# Patient Record
Sex: MALE | Race: WHITE | NOT HISPANIC OR LATINO
[De-identification: names, ages, dates, MRNs, and addresses within clinical notes are randomized per-mention and may not be internally consistent; named-entity substitution may affect disease eponyms.]

---

## 2017-04-03 ENCOUNTER — APPOINTMENT (OUTPATIENT)
Dept: FAMILY MEDICINE | Facility: CLINIC | Age: 67
End: 2017-04-03

## 2017-04-03 VITALS
SYSTOLIC BLOOD PRESSURE: 170 MMHG | WEIGHT: 238 LBS | HEIGHT: 68 IN | BODY MASS INDEX: 36.07 KG/M2 | OXYGEN SATURATION: 96 % | HEART RATE: 78 BPM | TEMPERATURE: 99.1 F | DIASTOLIC BLOOD PRESSURE: 98 MMHG

## 2017-04-03 VITALS — RESPIRATION RATE: 16 BRPM

## 2017-04-05 LAB
ALBUMIN SERPL ELPH-MCNC: 4.4 G/DL
ALP BLD-CCNC: 102 U/L
ALT SERPL-CCNC: 28 U/L
ANION GAP SERPL CALC-SCNC: 17 MMOL/L
APPEARANCE: CLEAR
AST SERPL-CCNC: 18 U/L
BACTERIA: NEGATIVE
BASOPHILS # BLD AUTO: 0.03 K/UL
BASOPHILS NFR BLD AUTO: 0.3 %
BILIRUB SERPL-MCNC: 0.3 MG/DL
BILIRUBIN URINE: NEGATIVE
BLOOD URINE: NEGATIVE
BUN SERPL-MCNC: 14 MG/DL
CALCIUM OXALATE CRYSTALS: ABNORMAL
CALCIUM SERPL-MCNC: 9.3 MG/DL
CHLORIDE SERPL-SCNC: 100 MMOL/L
CHOLEST SERPL-MCNC: 156 MG/DL
CHOLEST/HDLC SERPL: 4.7 RATIO
CK SERPL-CCNC: 128 U/L
CO2 SERPL-SCNC: 24 MMOL/L
COLOR: YELLOW
CREAT SERPL-MCNC: 0.72 MG/DL
CREAT SPEC-SCNC: 132 MG/DL
EOSINOPHIL # BLD AUTO: 0.31 K/UL
EOSINOPHIL NFR BLD AUTO: 3.3 %
GLUCOSE QUALITATIVE U: NORMAL MG/DL
GLUCOSE SERPL-MCNC: 176 MG/DL
HBA1C MFR BLD HPLC: 7.5 %
HCT VFR BLD CALC: 44.4 %
HDLC SERPL-MCNC: 33 MG/DL
HGB BLD-MCNC: 14.5 G/DL
IMM GRANULOCYTES NFR BLD AUTO: 0.1 %
KETONES URINE: NEGATIVE
LDLC SERPL CALC-MCNC: 82 MG/DL
LEUKOCYTE ESTERASE URINE: NEGATIVE
LYMPHOCYTES # BLD AUTO: 2.32 K/UL
LYMPHOCYTES NFR BLD AUTO: 25 %
MAN DIFF?: NORMAL
MCHC RBC-ENTMCNC: 28.7 PG
MCHC RBC-ENTMCNC: 32.7 GM/DL
MCV RBC AUTO: 87.7 FL
MICROALBUMIN 24H UR DL<=1MG/L-MCNC: 7.2 MG/DL
MICROALBUMIN/CREAT 24H UR-RTO: 55 UG/MG
MICROSCOPIC-UA: NORMAL
MONOCYTES # BLD AUTO: 0.76 K/UL
MONOCYTES NFR BLD AUTO: 8.2 %
NEUTROPHILS # BLD AUTO: 5.86 K/UL
NEUTROPHILS NFR BLD AUTO: 63.1 %
NITRITE URINE: NEGATIVE
PH URINE: 7
PLATELET # BLD AUTO: 262 K/UL
POTASSIUM SERPL-SCNC: 4.2 MMOL/L
PROT SERPL-MCNC: 7.2 G/DL
PROTEIN URINE: NEGATIVE MG/DL
RBC # BLD: 5.06 M/UL
RBC # FLD: 13.3 %
RED BLOOD CELLS URINE: 4 /HPF
SODIUM SERPL-SCNC: 141 MMOL/L
SPECIFIC GRAVITY URINE: 1.02
SQUAMOUS EPITHELIAL CELLS: 0 /HPF
TRIGL SERPL-MCNC: 203 MG/DL
UROBILINOGEN URINE: NORMAL MG/DL
WBC # FLD AUTO: 9.29 K/UL
WHITE BLOOD CELLS URINE: 2 /HPF

## 2017-05-08 ENCOUNTER — APPOINTMENT (OUTPATIENT)
Dept: FAMILY MEDICINE | Facility: CLINIC | Age: 67
End: 2017-05-08

## 2017-05-08 VITALS
RESPIRATION RATE: 16 BRPM | DIASTOLIC BLOOD PRESSURE: 90 MMHG | OXYGEN SATURATION: 95 % | SYSTOLIC BLOOD PRESSURE: 160 MMHG | HEART RATE: 82 BPM | TEMPERATURE: 98.8 F

## 2017-05-08 DIAGNOSIS — Z87.891 PERSONAL HISTORY OF NICOTINE DEPENDENCE: ICD-10-CM

## 2017-05-31 ENCOUNTER — RX RENEWAL (OUTPATIENT)
Age: 67
End: 2017-05-31

## 2017-07-14 ENCOUNTER — APPOINTMENT (OUTPATIENT)
Dept: FAMILY MEDICINE | Facility: CLINIC | Age: 67
End: 2017-07-14

## 2017-07-14 VITALS
HEIGHT: 68 IN | HEART RATE: 81 BPM | DIASTOLIC BLOOD PRESSURE: 79 MMHG | SYSTOLIC BLOOD PRESSURE: 160 MMHG | OXYGEN SATURATION: 96 % | BODY MASS INDEX: 36.07 KG/M2 | WEIGHT: 238 LBS | TEMPERATURE: 98.3 F

## 2017-07-14 VITALS — RESPIRATION RATE: 14 BRPM

## 2017-08-21 ENCOUNTER — APPOINTMENT (OUTPATIENT)
Dept: FAMILY MEDICINE | Facility: CLINIC | Age: 67
End: 2017-08-21
Payer: MEDICARE

## 2017-08-21 VITALS
OXYGEN SATURATION: 98 % | WEIGHT: 230 LBS | BODY MASS INDEX: 34.86 KG/M2 | DIASTOLIC BLOOD PRESSURE: 70 MMHG | SYSTOLIC BLOOD PRESSURE: 132 MMHG | TEMPERATURE: 98.4 F | HEART RATE: 84 BPM | HEIGHT: 68 IN | RESPIRATION RATE: 14 BRPM

## 2017-08-21 PROCEDURE — 99214 OFFICE O/P EST MOD 30 MIN: CPT | Mod: 25

## 2017-08-21 PROCEDURE — 36415 COLL VENOUS BLD VENIPUNCTURE: CPT

## 2017-08-23 LAB
ALBUMIN SERPL ELPH-MCNC: 4.4 G/DL
ALP BLD-CCNC: 92 U/L
ALT SERPL-CCNC: 30 U/L
ANION GAP SERPL CALC-SCNC: 16 MMOL/L
APPEARANCE: CLEAR
AST SERPL-CCNC: 16 U/L
BACTERIA: NEGATIVE
BASOPHILS # BLD AUTO: 0.02 K/UL
BASOPHILS NFR BLD AUTO: 0.2 %
BILIRUB SERPL-MCNC: 0.5 MG/DL
BILIRUBIN URINE: NEGATIVE
BLOOD URINE: NEGATIVE
BUN SERPL-MCNC: 16 MG/DL
CALCIUM SERPL-MCNC: 9.2 MG/DL
CHLORIDE SERPL-SCNC: 101 MMOL/L
CHOLEST SERPL-MCNC: 135 MG/DL
CHOLEST/HDLC SERPL: 4.8 RATIO
CK SERPL-CCNC: 141 U/L
CO2 SERPL-SCNC: 26 MMOL/L
COLOR: YELLOW
CREAT SERPL-MCNC: 0.95 MG/DL
CREAT SPEC-SCNC: 213 MG/DL
EOSINOPHIL # BLD AUTO: 0.27 K/UL
EOSINOPHIL NFR BLD AUTO: 3.1 %
GLUCOSE QUALITATIVE U: NORMAL MG/DL
GLUCOSE SERPL-MCNC: 157 MG/DL
HBA1C MFR BLD HPLC: 7.3 %
HCT VFR BLD CALC: 43.1 %
HDLC SERPL-MCNC: 28 MG/DL
HGB BLD-MCNC: 14.4 G/DL
IMM GRANULOCYTES NFR BLD AUTO: 0.1 %
KETONES URINE: NEGATIVE
LDLC SERPL CALC-MCNC: 64 MG/DL
LEUKOCYTE ESTERASE URINE: NEGATIVE
LYMPHOCYTES # BLD AUTO: 1.98 K/UL
LYMPHOCYTES NFR BLD AUTO: 22.8 %
MAN DIFF?: NORMAL
MCHC RBC-ENTMCNC: 29 PG
MCHC RBC-ENTMCNC: 33.4 GM/DL
MCV RBC AUTO: 86.9 FL
MICROALBUMIN 24H UR DL<=1MG/L-MCNC: 2.3 MG/DL
MICROALBUMIN/CREAT 24H UR-RTO: 11 MG/G
MICROSCOPIC-UA: NORMAL
MONOCYTES # BLD AUTO: 0.6 K/UL
MONOCYTES NFR BLD AUTO: 6.9 %
NEUTROPHILS # BLD AUTO: 5.8 K/UL
NEUTROPHILS NFR BLD AUTO: 66.9 %
NITRITE URINE: NEGATIVE
PH URINE: 6
PLATELET # BLD AUTO: 249 K/UL
POTASSIUM SERPL-SCNC: 3.6 MMOL/L
PROT SERPL-MCNC: 7.5 G/DL
PROTEIN URINE: NEGATIVE MG/DL
PSA SERPL-MCNC: 2.41 NG/ML
RBC # BLD: 4.96 M/UL
RBC # FLD: 13.5 %
RED BLOOD CELLS URINE: 4 /HPF
SODIUM SERPL-SCNC: 143 MMOL/L
SPECIFIC GRAVITY URINE: 1.02
SQUAMOUS EPITHELIAL CELLS: 0 /HPF
TRIGL SERPL-MCNC: 214 MG/DL
UROBILINOGEN URINE: NORMAL MG/DL
WBC # FLD AUTO: 8.68 K/UL
WHITE BLOOD CELLS URINE: 1 /HPF

## 2017-10-12 ENCOUNTER — RX RENEWAL (OUTPATIENT)
Age: 67
End: 2017-10-12

## 2017-11-30 ENCOUNTER — APPOINTMENT (OUTPATIENT)
Dept: FAMILY MEDICINE | Facility: CLINIC | Age: 67
End: 2017-11-30
Payer: MEDICARE

## 2017-11-30 VITALS
OXYGEN SATURATION: 97 % | HEIGHT: 70 IN | HEART RATE: 78 BPM | SYSTOLIC BLOOD PRESSURE: 150 MMHG | WEIGHT: 242 LBS | BODY MASS INDEX: 34.65 KG/M2 | DIASTOLIC BLOOD PRESSURE: 88 MMHG

## 2017-11-30 VITALS — RESPIRATION RATE: 14 BRPM

## 2017-11-30 DIAGNOSIS — K04.7 PERIAPICAL ABSCESS W/OUT SINUS: ICD-10-CM

## 2017-11-30 PROCEDURE — 90715 TDAP VACCINE 7 YRS/> IM: CPT

## 2017-11-30 PROCEDURE — 90471 IMMUNIZATION ADMIN: CPT

## 2017-11-30 PROCEDURE — 99214 OFFICE O/P EST MOD 30 MIN: CPT | Mod: 25

## 2017-12-03 ENCOUNTER — FORM ENCOUNTER (OUTPATIENT)
Age: 67
End: 2017-12-03

## 2017-12-04 ENCOUNTER — APPOINTMENT (OUTPATIENT)
Dept: CT IMAGING | Facility: HOSPITAL | Age: 67
End: 2017-12-04
Payer: MEDICARE

## 2017-12-04 ENCOUNTER — OUTPATIENT (OUTPATIENT)
Dept: OUTPATIENT SERVICES | Facility: HOSPITAL | Age: 67
LOS: 1 days | End: 2017-12-04
Payer: COMMERCIAL

## 2017-12-04 DIAGNOSIS — R91.1 SOLITARY PULMONARY NODULE: ICD-10-CM

## 2017-12-04 DIAGNOSIS — Z98.89 OTHER SPECIFIED POSTPROCEDURAL STATES: Chronic | ICD-10-CM

## 2017-12-04 DIAGNOSIS — Z90.49 ACQUIRED ABSENCE OF OTHER SPECIFIED PARTS OF DIGESTIVE TRACT: Chronic | ICD-10-CM

## 2017-12-04 DIAGNOSIS — R06.02 SHORTNESS OF BREATH: ICD-10-CM

## 2017-12-04 PROCEDURE — 71250 CT THORAX DX C-: CPT | Mod: 26

## 2017-12-04 PROCEDURE — 71250 CT THORAX DX C-: CPT

## 2017-12-29 ENCOUNTER — APPOINTMENT (OUTPATIENT)
Dept: FAMILY MEDICINE | Facility: CLINIC | Age: 67
End: 2017-12-29
Payer: MEDICARE

## 2017-12-29 VITALS
HEART RATE: 80 BPM | OXYGEN SATURATION: 97 % | SYSTOLIC BLOOD PRESSURE: 154 MMHG | WEIGHT: 234 LBS | BODY MASS INDEX: 33.5 KG/M2 | DIASTOLIC BLOOD PRESSURE: 100 MMHG | HEIGHT: 70 IN

## 2017-12-29 VITALS — RESPIRATION RATE: 16 BRPM

## 2017-12-29 DIAGNOSIS — R91.1 SOLITARY PULMONARY NODULE: ICD-10-CM

## 2017-12-29 PROCEDURE — 99214 OFFICE O/P EST MOD 30 MIN: CPT

## 2017-12-29 RX ORDER — SULFAMETHOXAZOLE AND TRIMETHOPRIM 800; 160 MG/1; MG/1
800-160 TABLET ORAL
Qty: 14 | Refills: 0 | Status: COMPLETED | COMMUNITY
Start: 2017-11-01

## 2017-12-29 RX ORDER — QUINAPRIL HYDROCHLORIDE 40 MG/1
40 TABLET, FILM COATED ORAL
Qty: 30 | Refills: 1 | Status: COMPLETED | COMMUNITY
Start: 2017-11-30 | End: 2017-12-29

## 2017-12-29 RX ORDER — CEFUROXIME AXETIL 500 MG/1
500 TABLET ORAL
Qty: 4 | Refills: 0 | Status: COMPLETED | COMMUNITY
Start: 2017-08-02

## 2017-12-29 RX ORDER — AMOXICILLIN 500 MG/1
500 CAPSULE ORAL
Qty: 30 | Refills: 0 | Status: COMPLETED | COMMUNITY
Start: 2017-11-30

## 2018-01-01 ENCOUNTER — RX RENEWAL (OUTPATIENT)
Age: 68
End: 2018-01-01

## 2018-01-19 ENCOUNTER — MEDICATION RENEWAL (OUTPATIENT)
Age: 68
End: 2018-01-19

## 2018-02-26 ENCOUNTER — MEDICATION RENEWAL (OUTPATIENT)
Age: 68
End: 2018-02-26

## 2018-03-07 ENCOUNTER — RX RENEWAL (OUTPATIENT)
Age: 68
End: 2018-03-07

## 2018-04-03 ENCOUNTER — RX RENEWAL (OUTPATIENT)
Age: 68
End: 2018-04-03

## 2018-04-30 ENCOUNTER — RX RENEWAL (OUTPATIENT)
Age: 68
End: 2018-04-30

## 2018-05-03 ENCOUNTER — RX RENEWAL (OUTPATIENT)
Age: 68
End: 2018-05-03

## 2018-05-03 ENCOUNTER — APPOINTMENT (OUTPATIENT)
Dept: FAMILY MEDICINE | Facility: CLINIC | Age: 68
End: 2018-05-03
Payer: COMMERCIAL

## 2018-05-03 VITALS
SYSTOLIC BLOOD PRESSURE: 174 MMHG | WEIGHT: 236 LBS | TEMPERATURE: 98.8 F | HEIGHT: 70 IN | BODY MASS INDEX: 33.79 KG/M2 | DIASTOLIC BLOOD PRESSURE: 82 MMHG | HEART RATE: 89 BPM | OXYGEN SATURATION: 95 %

## 2018-05-03 VITALS — RESPIRATION RATE: 16 BRPM

## 2018-05-03 DIAGNOSIS — R06.09 OTHER FORMS OF DYSPNEA: ICD-10-CM

## 2018-05-03 PROCEDURE — 99215 OFFICE O/P EST HI 40 MIN: CPT

## 2018-05-04 LAB
ALBUMIN SERPL ELPH-MCNC: 4.5 G/DL
ALP BLD-CCNC: 68 U/L
ALT SERPL-CCNC: 27 U/L
ANION GAP SERPL CALC-SCNC: 16 MMOL/L
APPEARANCE: CLEAR
AST SERPL-CCNC: 17 U/L
BACTERIA: NEGATIVE
BASOPHILS # BLD AUTO: 0.03 K/UL
BASOPHILS NFR BLD AUTO: 0.3 %
BILIRUB SERPL-MCNC: 0.3 MG/DL
BILIRUBIN URINE: NEGATIVE
BLOOD URINE: NEGATIVE
BUN SERPL-MCNC: 21 MG/DL
CALCIUM SERPL-MCNC: 9.9 MG/DL
CHLORIDE SERPL-SCNC: 99 MMOL/L
CHOLEST SERPL-MCNC: 164 MG/DL
CHOLEST/HDLC SERPL: 5.3 RATIO
CK SERPL-CCNC: 103 U/L
CO2 SERPL-SCNC: 28 MMOL/L
COLOR: YELLOW
CREAT SERPL-MCNC: 0.98 MG/DL
EOSINOPHIL # BLD AUTO: 0.23 K/UL
EOSINOPHIL NFR BLD AUTO: 1.9 %
GLUCOSE QUALITATIVE U: NEGATIVE MG/DL
GLUCOSE SERPL-MCNC: 208 MG/DL
HBA1C MFR BLD HPLC: 8.4 %
HCT VFR BLD CALC: 46.2 %
HDLC SERPL-MCNC: 31 MG/DL
HGB BLD-MCNC: 15.1 G/DL
IMM GRANULOCYTES NFR BLD AUTO: 0.2 %
KETONES URINE: NEGATIVE
LDLC SERPL CALC-MCNC: 82 MG/DL
LEUKOCYTE ESTERASE URINE: NEGATIVE
LYMPHOCYTES # BLD AUTO: 3.5 K/UL
LYMPHOCYTES NFR BLD AUTO: 29.4 %
MAN DIFF?: NORMAL
MCHC RBC-ENTMCNC: 28.6 PG
MCHC RBC-ENTMCNC: 32.7 GM/DL
MCV RBC AUTO: 87.5 FL
MICROSCOPIC-UA: NORMAL
MONOCYTES # BLD AUTO: 0.99 K/UL
MONOCYTES NFR BLD AUTO: 8.3 %
NEUTROPHILS # BLD AUTO: 7.13 K/UL
NEUTROPHILS NFR BLD AUTO: 59.9 %
NITRITE URINE: NEGATIVE
PH URINE: 6
PLATELET # BLD AUTO: 284 K/UL
POTASSIUM SERPL-SCNC: 3.8 MMOL/L
PROT SERPL-MCNC: 7.5 G/DL
PROTEIN URINE: NEGATIVE MG/DL
PSA SERPL-MCNC: 2.72 NG/ML
RBC # BLD: 5.28 M/UL
RBC # FLD: 13.2 %
RED BLOOD CELLS URINE: 6 /HPF
SODIUM SERPL-SCNC: 143 MMOL/L
SPECIFIC GRAVITY URINE: 1.03
SQUAMOUS EPITHELIAL CELLS: 0 /HPF
TRIGL SERPL-MCNC: 254 MG/DL
UROBILINOGEN URINE: NEGATIVE MG/DL
WBC # FLD AUTO: 11.9 K/UL
WHITE BLOOD CELLS URINE: 1 /HPF

## 2018-05-07 LAB
CREAT SPEC-SCNC: 167 MG/DL
MICROALBUMIN 24H UR DL<=1MG/L-MCNC: 5.2 MG/DL
MICROALBUMIN/CREAT 24H UR-RTO: 31 MG/G

## 2018-05-14 ENCOUNTER — CHART COPY (OUTPATIENT)
Age: 68
End: 2018-05-14

## 2018-05-18 ENCOUNTER — RX RENEWAL (OUTPATIENT)
Age: 68
End: 2018-05-18

## 2018-05-25 ENCOUNTER — RX RENEWAL (OUTPATIENT)
Age: 68
End: 2018-05-25

## 2018-06-15 ENCOUNTER — RX RENEWAL (OUTPATIENT)
Age: 68
End: 2018-06-15

## 2018-07-26 ENCOUNTER — RX RENEWAL (OUTPATIENT)
Age: 68
End: 2018-07-26

## 2018-09-17 ENCOUNTER — RX RENEWAL (OUTPATIENT)
Age: 68
End: 2018-09-17

## 2018-09-27 ENCOUNTER — APPOINTMENT (OUTPATIENT)
Dept: FAMILY MEDICINE | Facility: CLINIC | Age: 68
End: 2018-09-27
Payer: COMMERCIAL

## 2018-09-27 VITALS
RESPIRATION RATE: 14 BRPM | HEIGHT: 70 IN | HEART RATE: 76 BPM | TEMPERATURE: 98.2 F | WEIGHT: 219 LBS | SYSTOLIC BLOOD PRESSURE: 148 MMHG | DIASTOLIC BLOOD PRESSURE: 80 MMHG | BODY MASS INDEX: 31.35 KG/M2 | OXYGEN SATURATION: 97 %

## 2018-09-27 PROCEDURE — 99214 OFFICE O/P EST MOD 30 MIN: CPT | Mod: 25

## 2018-09-27 PROCEDURE — 36415 COLL VENOUS BLD VENIPUNCTURE: CPT

## 2018-09-27 NOTE — ASSESSMENT
[FreeTextEntry1] : Assessment and plan:\par \par 1. Hypertension, presently difficult to control.. Patient will continue to keep home log and lifestyle changes will be instituted, which will include a weight loss program.Patient has been tolerating beta blocker, blood pressure still slightly elevated. Amlodipine has been stopped patient's edema has improved.\par \par 2. Diabetes mellitus, type II continue present medical management. Check hemoglobin A1c. No concentrated sweets, consistent carbohydrate diet.\par \par 3. Hyperlipidemia. Continue simvastatin 20 mg p.o. at bedtime.\par \par 4. Elevated BMI. Detailed discussion with loss program. Wife is present. She will make sure that he will follow a diet

## 2018-09-27 NOTE — REVIEW OF SYSTEMS
[Chest Pain] : no chest pain [Palpitations] : no palpitations [Lower Ext Edema] : lower extremity edema [Orthopena] : no orthopnea [Paroysmal Nocturnal Dyspnea] : no paroysmal nocturnal dyspnea [Shortness Of Breath] : no shortness of breath [Wheezing] : no wheezing [Cough] : no cough [Dyspnea on Exertion] : dyspnea on exertion [Negative] : Heme/Lymph

## 2018-09-27 NOTE — HISTORY OF PRESENT ILLNESS
[Diabetes Mellitus] : Diabetes Mellitus [Hyperlipidemia] : Hyperlipidemia [Hypertension] : Hypertension [Obesity] : Obesity [Other: ___] : [unfilled]: [Spouse] : spouse [Does not check] : Patient does not check blood glucose regularly [Understanding of foot care] : Patient expressed understanding of foot care [Most Recent A1C: ___] : Most recent A1C was [unfilled] [Near target goal] : A1C near target goal [Moderate Intensity] : Patient is currently on moderate intensity statin  therapy [Checks BP Regularly] : The patient checks ~his/her~ blood pressure regularly [<130/90] : Target blood pressure is  <130/90 [Target goal not met] : BP target goal not met [Doing Well] : Patient is doing well [Managed with medications] : managed with  medication [Moderate Intensity Therapy] : Patient is currently on moderate intensity statin  therapy [No Weight Changes] : No weight changes [None] : No weight lost attempts [Sedentary] : Patient is sedentary [Patient refused treatment] : Patient refused treatment [Pre-contemplation] : Pre-contemplation: patient is not planning to lose weight within the next 6 months [Good understanding] : Patient has a good understanding of disease, goals and obesity follow-up plan [FreeTextEntry6] : Patient is a 68-year-old gentleman, who presents today for followup of maintenance visit. Patient states it is in his usual state of health. His medical history is significant for diabetes mellitus, type II hyperlipidemia, hypertension, at times difficult to control elevated BMI and peripheral vascular disease.\par \par Patient is awake, alert, and oriented x3. He denies any chest pain, shortness of breath, nausea, vomiting, no change in bowel habit. He has chronic lower pedal edema, which may be secondary to amlodipine, but also complicated by peripheral vascular disease. Patient states that he tolerates it well without any problem.\par \par Patient declines all immunizations. Except for tetanus, which she has had in the past. [Retinopathy] : No retinopathy [EyeExamDate] : 05/17 [de-identified] : 140/80

## 2018-09-27 NOTE — HEALTH RISK ASSESSMENT
[No falls in past year] : Patient reported no falls in the past year [0] : 2) Feeling down, depressed, or hopeless: Not at all (0) [] : No [[id number]] : 0

## 2018-09-28 LAB
ALBUMIN SERPL ELPH-MCNC: 4.8 G/DL
ALP BLD-CCNC: 58 U/L
ALT SERPL-CCNC: 27 U/L
ANION GAP SERPL CALC-SCNC: 16 MMOL/L
AST SERPL-CCNC: 19 U/L
BASOPHILS # BLD AUTO: 0.02 K/UL
BASOPHILS NFR BLD AUTO: 0.3 %
BILIRUB SERPL-MCNC: 0.6 MG/DL
BUN SERPL-MCNC: 22 MG/DL
CALCIUM SERPL-MCNC: 9.9 MG/DL
CHLORIDE SERPL-SCNC: 101 MMOL/L
CHOLEST SERPL-MCNC: 176 MG/DL
CHOLEST/HDLC SERPL: 4.8 RATIO
CK SERPL-CCNC: 104 U/L
CO2 SERPL-SCNC: 27 MMOL/L
CREAT SERPL-MCNC: 0.93 MG/DL
EOSINOPHIL # BLD AUTO: 0.14 K/UL
EOSINOPHIL NFR BLD AUTO: 1.8 %
GLUCOSE SERPL-MCNC: 137 MG/DL
HBA1C MFR BLD HPLC: 6 %
HCT VFR BLD CALC: 49 %
HCV AB SER QL: NONREACTIVE
HCV S/CO RATIO: 0.07 S/CO
HDLC SERPL-MCNC: 37 MG/DL
HGB BLD-MCNC: 16.1 G/DL
IMM GRANULOCYTES NFR BLD AUTO: 0.3 %
LDLC SERPL CALC-MCNC: 108 MG/DL
LYMPHOCYTES # BLD AUTO: 1.91 K/UL
LYMPHOCYTES NFR BLD AUTO: 24.7 %
MAN DIFF?: NORMAL
MCHC RBC-ENTMCNC: 29 PG
MCHC RBC-ENTMCNC: 32.9 GM/DL
MCV RBC AUTO: 88.3 FL
MONOCYTES # BLD AUTO: 0.73 K/UL
MONOCYTES NFR BLD AUTO: 9.4 %
NEUTROPHILS # BLD AUTO: 4.92 K/UL
NEUTROPHILS NFR BLD AUTO: 63.5 %
PLATELET # BLD AUTO: 221 K/UL
POTASSIUM SERPL-SCNC: 3.7 MMOL/L
PROT SERPL-MCNC: 7.7 G/DL
RBC # BLD: 5.55 M/UL
RBC # FLD: 14 %
SODIUM SERPL-SCNC: 144 MMOL/L
TRIGL SERPL-MCNC: 153 MG/DL
WBC # FLD AUTO: 7.74 K/UL

## 2018-10-26 ENCOUNTER — RX RENEWAL (OUTPATIENT)
Age: 68
End: 2018-10-26

## 2018-11-01 ENCOUNTER — FORM ENCOUNTER (OUTPATIENT)
Age: 68
End: 2018-11-01

## 2018-11-02 ENCOUNTER — APPOINTMENT (OUTPATIENT)
Dept: ULTRASOUND IMAGING | Facility: HOSPITAL | Age: 68
End: 2018-11-02
Payer: COMMERCIAL

## 2018-11-02 ENCOUNTER — OUTPATIENT (OUTPATIENT)
Dept: OUTPATIENT SERVICES | Facility: HOSPITAL | Age: 68
LOS: 1 days | End: 2018-11-02
Payer: COMMERCIAL

## 2018-11-02 DIAGNOSIS — Z98.89 OTHER SPECIFIED POSTPROCEDURAL STATES: Chronic | ICD-10-CM

## 2018-11-02 DIAGNOSIS — Z90.49 ACQUIRED ABSENCE OF OTHER SPECIFIED PARTS OF DIGESTIVE TRACT: Chronic | ICD-10-CM

## 2018-11-02 DIAGNOSIS — Z00.8 ENCOUNTER FOR OTHER GENERAL EXAMINATION: ICD-10-CM

## 2018-11-02 PROCEDURE — 76870 US EXAM SCROTUM: CPT | Mod: 26

## 2018-11-02 PROCEDURE — 76870 US EXAM SCROTUM: CPT

## 2018-12-01 ENCOUNTER — APPOINTMENT (OUTPATIENT)
Dept: ULTRASOUND IMAGING | Facility: HOSPITAL | Age: 68
End: 2018-12-01
Payer: COMMERCIAL

## 2018-12-01 ENCOUNTER — OUTPATIENT (OUTPATIENT)
Dept: OUTPATIENT SERVICES | Facility: HOSPITAL | Age: 68
LOS: 1 days | End: 2018-12-01
Payer: COMMERCIAL

## 2018-12-01 DIAGNOSIS — Z98.89 OTHER SPECIFIED POSTPROCEDURAL STATES: Chronic | ICD-10-CM

## 2018-12-01 DIAGNOSIS — Z00.8 ENCOUNTER FOR OTHER GENERAL EXAMINATION: ICD-10-CM

## 2018-12-01 DIAGNOSIS — Z90.49 ACQUIRED ABSENCE OF OTHER SPECIFIED PARTS OF DIGESTIVE TRACT: Chronic | ICD-10-CM

## 2018-12-01 PROCEDURE — 76870 US EXAM SCROTUM: CPT | Mod: 26

## 2018-12-01 PROCEDURE — 76870 US EXAM SCROTUM: CPT

## 2018-12-19 ENCOUNTER — RX RENEWAL (OUTPATIENT)
Age: 68
End: 2018-12-19

## 2018-12-20 ENCOUNTER — RX RENEWAL (OUTPATIENT)
Age: 68
End: 2018-12-20

## 2018-12-27 ENCOUNTER — APPOINTMENT (OUTPATIENT)
Dept: FAMILY MEDICINE | Facility: CLINIC | Age: 68
End: 2018-12-27
Payer: COMMERCIAL

## 2018-12-27 VITALS
SYSTOLIC BLOOD PRESSURE: 132 MMHG | HEART RATE: 70 BPM | RESPIRATION RATE: 14 BRPM | TEMPERATURE: 98.2 F | DIASTOLIC BLOOD PRESSURE: 74 MMHG | OXYGEN SATURATION: 98 % | HEIGHT: 70 IN | BODY MASS INDEX: 31.5 KG/M2 | WEIGHT: 220 LBS

## 2018-12-27 PROCEDURE — 99214 OFFICE O/P EST MOD 30 MIN: CPT | Mod: 25

## 2018-12-27 PROCEDURE — 36415 COLL VENOUS BLD VENIPUNCTURE: CPT

## 2018-12-27 NOTE — PHYSICAL EXAM
[No Acute Distress] : no acute distress [Well Nourished] : well nourished [Well Developed] : well developed [Well-Appearing] : well-appearing [Normal Sclera/Conjunctiva] : normal sclera/conjunctiva [PERRL] : pupils equal round and reactive to light [EOMI] : extraocular movements intact [Normal Outer Ear/Nose] : the outer ears and nose were normal in appearance [Normal Oropharynx] : the oropharynx was normal [No JVD] : no jugular venous distention [Supple] : supple [No Lymphadenopathy] : no lymphadenopathy [Thyroid Normal, No Nodules] : the thyroid was normal and there were no nodules present [No Respiratory Distress] : no respiratory distress  [Clear to Auscultation] : lungs were clear to auscultation bilaterally [No Accessory Muscle Use] : no accessory muscle use [Normal Rate] : normal rate  [Regular Rhythm] : with a regular rhythm [Normal S1, S2] : normal S1 and S2 [No Murmur] : no murmur heard [No Carotid Bruits] : no carotid bruits [No Abdominal Bruit] : a ~M bruit was not heard ~T in the abdomen [No Varicosities] : no varicosities [Pedal Pulses Present] : the pedal pulses are present [No Edema] : there was no peripheral edema [No Extremity Clubbing/Cyanosis] : no extremity clubbing/cyanosis [No Palpable Aorta] : no palpable aorta [Soft] : abdomen soft [Non Tender] : non-tender [Non-distended] : non-distended [No Masses] : no abdominal mass palpated [No HSM] : no HSM [Normal Bowel Sounds] : normal bowel sounds [Normal Posterior Cervical Nodes] : no posterior cervical lymphadenopathy [Normal Anterior Cervical Nodes] : no anterior cervical lymphadenopathy [No CVA Tenderness] : no CVA  tenderness [No Spinal Tenderness] : no spinal tenderness [No Joint Swelling] : no joint swelling [Grossly Normal Strength/Tone] : grossly normal strength/tone [No Rash] : no rash [No Skin Lesions] : no skin lesions [Normal Gait] : normal gait [Coordination Grossly Intact] : coordination grossly intact [No Focal Deficits] : no focal deficits [Deep Tendon Reflexes (DTR)] : deep tendon reflexes were 2+ and symmetric [Speech Grossly Normal] : speech grossly normal [Memory Grossly Normal] : memory grossly normal [Normal Affect] : the affect was normal [Alert and Oriented x3] : oriented to person, place, and time [Normal Mood] : the mood was normal [Normal Insight/Judgement] : insight and judgment were intact [Comprehensive Foot Exam Normal] : Right and left foot were examined and both feet are normal. No ulcers in either foot. Toes are normal and with full ROM.  Normal tactile sensation with monofilament testing throughout both feet [de-identified] : ecept for pedal edema

## 2018-12-27 NOTE — ASSESSMENT
[FreeTextEntry1] : Assessment and plan:\par \par 1. Hypertension excellent blood pressure control. Continue clonidine 0.1 mg twice a day, hydralazine 50 mg twice a day. Metroprolol 50 mg p.o. daily. Patient also taken valsartan hydrochlorothiazide. Continue lifestyle changes.\par \par 2. Diabetes mellitus, type II. Patient tolerating metformin 500 mg twice a day. I will check comprehensive blood work, which will include hemoglobin A1c.\par \par 3. Hyperlipidemia. Patient tolerating and doing well with simvastatin 20 mg p.o. at bedtime. Check comprehensive blood work, which will include comprehensive metabolic lipid profile, and CPK.\par \par 4. Elevated BMI. Discussed lifestyle changes.\par \par 5. History of bladder cancer. Patient is followed very closely by urology. Will be having cystoscopy this coming January.\par \par 6. Elevated PSA. Check prostate-specific antigen, total and free a copy of the blood work will be sent to urology

## 2018-12-27 NOTE — COUNSELING
[Weight management counseling provided] : Weight management [Healthy eating counseling provided] : healthy eating [Activity counseling provided] : activity [Disease Management counseling provided] : disease management  [Behavioral health counseling provided] : behavioral health  [Target Wt Loss Goal ___] : Target weight loss goal [unfilled] lbs [Weight Self Once Weekly] : Weight self once weekly [Walking] : Walking [___ min/wk activity recom] : [unfilled] min/wk activity recommended [Low Fat Diet] : Low fat diet [Low Salt Diet] : Low salt diet [Decrease Portions] : Decrease food portions [Keep Food Diary] : Keep food diary [Take medications as directed] : Take medications as directed [Check sugar ___ times per week] : Check blood sugar [unfilled]  times per week [Check feet daily] : Check feet daily [Keep FS  log to bring to next visit] : Keep finger stick log and  bring  to next visit [Maintain symptoms  log] : Maintain symptoms log [Take your weight daily] : Take your weight daily [Maintain weight log] : Maintain weight log [Check BP at home ___ x/week] : Check blood pressure at home [unfilled] times per week [Keep BP log to bring to next visit] : Keep BP log to bring to next visit [Avoid Social Isolation] : Avoid social isolation [Sleep ___ hours/day] : Sleep [unfilled] hours/day [Engage in a relaxing activity] : Engage in a relaxing activity [Plan in advance] : Plan in advance [None] : None [Good understanding] : Patient has a good understanding of lifestyle changes and the steps needed to achieve self management goals

## 2018-12-27 NOTE — HISTORY OF PRESENT ILLNESS
[Diabetes Mellitus] : Diabetes Mellitus [Hyperlipidemia] : Hyperlipidemia [Hypertension] : Hypertension [Obesity] : Obesity [Other: ___] : [unfilled]: [Spouse] : spouse [Does not check] : Patient does not check blood glucose regularly [Understanding of foot care] : Patient expressed understanding of foot care [Most Recent A1C: ___] : Most recent A1C was [unfilled] [Near target goal] : A1C near target goal [Moderate Intensity] : Patient is currently on moderate intensity statin  therapy [Checks BP Regularly] : The patient checks ~his/her~ blood pressure regularly [<130/90] : Target blood pressure is  <130/90 [Target goal not met] : BP target goal not met [Doing Well] : Patient is doing well [Managed with medications] : managed with  medication [Moderate Intensity Therapy] : Patient is currently on moderate intensity statin  therapy [No Weight Changes] : No weight changes [None] : No weight lost attempts [Sedentary] : Patient is sedentary [Patient refused treatment] : Patient refused treatment [Pre-contemplation] : Pre-contemplation: patient is not planning to lose weight within the next 6 months [Good understanding] : Patient has a good understanding of disease, goals and obesity follow-up plan [FreeTextEntry6] : Patient is a 68-year-old gentleman, who presents today for followup disease management. Patient does have multiple medical problems and we will be addressing diabetes mellitus, type II. Hyperlipidemia, hypertension, elevated BMI. Peripheral vascular disease with chronic lower extremity edema and bladder cancer, for which the patient is followed by urology. Patient also has history of colon cancer, status post resection\par \par Patient is awake, alert, and oriented x3, presently in no acute distress. He is calm, cooperative, well-groomed, and nourished. Presently denies any chest pain. He does admit to exertional dyspnea, which has been a chronic problem. He has been evaluated by cardiology in the past. [Retinopathy] : No retinopathy [EyeExamDate] : 05/17 [de-identified] : 140/80

## 2018-12-27 NOTE — HEALTH RISK ASSESSMENT
[No falls in past year] : Patient reported no falls in the past year [0] : 2) Feeling down, depressed, or hopeless: Not at all (0) [] : No [de-identified] : social; [[id number]] : 0

## 2018-12-27 NOTE — REVIEW OF SYSTEMS
[Lower Ext Edema] : lower extremity edema [Dyspnea on Exertion] : dyspnea on exertion [Negative] : Heme/Lymph [Chest Pain] : no chest pain [Palpitations] : no palpitations [Orthopena] : no orthopnea [Paroysmal Nocturnal Dyspnea] : no paroysmal nocturnal dyspnea [Shortness Of Breath] : no shortness of breath [Wheezing] : no wheezing [Cough] : no cough

## 2018-12-28 LAB
ALBUMIN SERPL ELPH-MCNC: 4.1 G/DL
ALP BLD-CCNC: 67 U/L
ALT SERPL-CCNC: 17 U/L
ANION GAP SERPL CALC-SCNC: 14 MMOL/L
AST SERPL-CCNC: 13 U/L
BASOPHILS # BLD AUTO: 0.01 K/UL
BASOPHILS NFR BLD AUTO: 0.1 %
BILIRUB SERPL-MCNC: 0.4 MG/DL
BUN SERPL-MCNC: 20 MG/DL
CALCIUM SERPL-MCNC: 9.2 MG/DL
CHLORIDE SERPL-SCNC: 101 MMOL/L
CHOLEST SERPL-MCNC: 147 MG/DL
CHOLEST/HDLC SERPL: 4.7 RATIO
CK SERPL-CCNC: 72 U/L
CO2 SERPL-SCNC: 29 MMOL/L
CREAT SERPL-MCNC: 0.93 MG/DL
EOSINOPHIL # BLD AUTO: 0.25 K/UL
EOSINOPHIL NFR BLD AUTO: 2.7 %
GLUCOSE SERPL-MCNC: 148 MG/DL
HBA1C MFR BLD HPLC: 6 %
HCT VFR BLD CALC: 42.9 %
HDLC SERPL-MCNC: 31 MG/DL
HGB BLD-MCNC: 13.9 G/DL
IMM GRANULOCYTES NFR BLD AUTO: 0.3 %
LDLC SERPL CALC-MCNC: 87 MG/DL
LYMPHOCYTES # BLD AUTO: 1.19 K/UL
LYMPHOCYTES NFR BLD AUTO: 13.1 %
MAN DIFF?: NORMAL
MCHC RBC-ENTMCNC: 28.7 PG
MCHC RBC-ENTMCNC: 32.4 GM/DL
MCV RBC AUTO: 88.6 FL
MONOCYTES # BLD AUTO: 0.7 K/UL
MONOCYTES NFR BLD AUTO: 7.7 %
NEUTROPHILS # BLD AUTO: 6.93 K/UL
NEUTROPHILS NFR BLD AUTO: 76.1 %
PLATELET # BLD AUTO: 231 K/UL
POTASSIUM SERPL-SCNC: 3.7 MMOL/L
PROT SERPL-MCNC: 6.9 G/DL
PSA FREE FLD-MCNC: 26 %
PSA FREE SERPL-MCNC: 0.71 NG/ML
PSA SERPL-MCNC: 2.68 NG/ML
RBC # BLD: 4.84 M/UL
RBC # FLD: 13.9 %
SODIUM SERPL-SCNC: 144 MMOL/L
T4 FREE SERPL-MCNC: 1.2 NG/DL
TRIGL SERPL-MCNC: 145 MG/DL
TSH SERPL-ACNC: 2.19 UIU/ML
WBC # FLD AUTO: 9.11 K/UL

## 2019-03-16 ENCOUNTER — RX RENEWAL (OUTPATIENT)
Age: 69
End: 2019-03-16

## 2019-03-31 ENCOUNTER — RX RENEWAL (OUTPATIENT)
Age: 69
End: 2019-03-31

## 2019-04-18 ENCOUNTER — APPOINTMENT (OUTPATIENT)
Dept: FAMILY MEDICINE | Facility: CLINIC | Age: 69
End: 2019-04-18
Payer: COMMERCIAL

## 2019-04-18 VITALS
RESPIRATION RATE: 12 BRPM | HEART RATE: 80 BPM | DIASTOLIC BLOOD PRESSURE: 70 MMHG | WEIGHT: 220 LBS | SYSTOLIC BLOOD PRESSURE: 128 MMHG | BODY MASS INDEX: 31.5 KG/M2 | HEIGHT: 70 IN | OXYGEN SATURATION: 96 %

## 2019-04-18 PROCEDURE — 99214 OFFICE O/P EST MOD 30 MIN: CPT

## 2019-04-18 RX ORDER — AMOXICILLIN 500 MG/1
500 TABLET, FILM COATED ORAL 3 TIMES DAILY
Qty: 30 | Refills: 1 | Status: COMPLETED | COMMUNITY
Start: 2017-11-30 | End: 2019-04-18

## 2019-04-18 NOTE — REVIEW OF SYSTEMS
[Chest Pain] : no chest pain [Palpitations] : no palpitations [Lower Ext Edema] : lower extremity edema [Orthopena] : no orthopnea [Paroysmal Nocturnal Dyspnea] : no paroysmal nocturnal dyspnea [Shortness Of Breath] : no shortness of breath [Cough] : no cough [Wheezing] : no wheezing [Dyspnea on Exertion] : dyspnea on exertion [Negative] : Heme/Lymph

## 2019-04-18 NOTE — HISTORY OF PRESENT ILLNESS
[Diabetes Mellitus] : Diabetes Mellitus [Hyperlipidemia] : Hyperlipidemia [Obesity] : Obesity [Hypertension] : Hypertension [Other: ___] : [unfilled] [FreeTextEntry6] : Patient is a 69-year-old gentleman, who presents today for followup disease management visit. Medical history significant for diabetes mellitus, type II, actually very well controlled. Most recent hemoglobin A1c 6.0, hyperlipidemia, hypertension, elevated BMI. Which we will be addressing today.\par \par Patient has been diagnosed with transitional cell carcinoma, bladder, has already received 18 treatments of BCG. Patient is questioning whether or not he needs to continue this treatment urology as saying he would like to continue treatment for least 5 years. Patient has done research as found that usually 3 years are sufficient. For completeness sake. Detailed discussion with patient regarding obtaining a second opinion regarding transitional cell carcinoma treatment of. [Does not check] : Patient does not check blood glucose regularly [Understanding of foot care] : Patient expressed understanding of foot care [Most Recent A1C: ___] : Most recent A1C was [unfilled] [Near target goal] : A1C near target goal [Moderate Intensity] : Patient is currently on moderate intensity statin  therapy [Checks BP Regularly] : The patient checks ~his/her~ blood pressure regularly [<130/90] : Target blood pressure is  <130/90 [Target goal met] : BP target goal met [Doing Well] : Patient is doing well [Moderate Intensity Therapy] : Patient is currently on moderate intensity statin  therapy [Managed with medications] : managed with  medication [No Weight Changes] : No weight changes [Patient refused treatment] : Patient refused treatment [Sedentary] : Patient is sedentary [None] : No weight lost attempts [Good understanding] : Patient has a good understanding of disease, goals and obesity follow-up plan [Pre-contemplation] : Pre-contemplation: patient is not planning to lose weight within the next 6 months

## 2019-04-18 NOTE — ASSESSMENT
[FreeTextEntry1] : Assessment and plan:\par \par 1. Hypertension excellent blood pressure control. Continue clonidine 0.1 mg twice a day, hydralazine 50 mg twice a day. Metoprolol 50 mg p.o. daily. Patient also taken valsartan hydrochlorothiazide. Continue lifestyle changes.\par \par 2. Diabetes mellitus, type II. Patient tolerating metformin 500 mg twice a day. Most recent hemoglobin A1c 6.0, and excellent blood sugar control\par \par 3. Hyperlipidemia. Patient tolerating and doing well with simvastatin 20 mg p.o. \par \par 4. Elevated BMI. Discussed lifestyle changes.\par \par 5. History of bladder cancer. Patient is followed very closely by urology. Second opinion regarding treatment of transitional cell carcinoma\par \par  no abdominal pain, no diarrhea, no nausea and no vomiting.

## 2019-04-18 NOTE — PHYSICAL EXAM
[No Acute Distress] : no acute distress [Well Developed] : well developed [Well Nourished] : well nourished [Well-Appearing] : well-appearing [Normal Sclera/Conjunctiva] : normal sclera/conjunctiva [PERRL] : pupils equal round and reactive to light [Normal Outer Ear/Nose] : the outer ears and nose were normal in appearance [EOMI] : extraocular movements intact [Normal Oropharynx] : the oropharynx was normal [No JVD] : no jugular venous distention [Supple] : supple [No Lymphadenopathy] : no lymphadenopathy [Thyroid Normal, No Nodules] : the thyroid was normal and there were no nodules present [No Respiratory Distress] : no respiratory distress  [Clear to Auscultation] : lungs were clear to auscultation bilaterally [No Accessory Muscle Use] : no accessory muscle use [Normal Rate] : normal rate  [Regular Rhythm] : with a regular rhythm [Normal S1, S2] : normal S1 and S2 [No Murmur] : no murmur heard [No Carotid Bruits] : no carotid bruits [No Abdominal Bruit] : a ~M bruit was not heard ~T in the abdomen [No Varicosities] : no varicosities [Pedal Pulses Present] : the pedal pulses are present [No Edema] : there was no peripheral edema [No Extremity Clubbing/Cyanosis] : no extremity clubbing/cyanosis [No Palpable Aorta] : no palpable aorta [Non Tender] : non-tender [Soft] : abdomen soft [Non-distended] : non-distended [No Masses] : no abdominal mass palpated [No HSM] : no HSM [Normal Bowel Sounds] : normal bowel sounds [Normal Posterior Cervical Nodes] : no posterior cervical lymphadenopathy [No CVA Tenderness] : no CVA  tenderness [Normal Anterior Cervical Nodes] : no anterior cervical lymphadenopathy [No Spinal Tenderness] : no spinal tenderness [No Joint Swelling] : no joint swelling [Grossly Normal Strength/Tone] : grossly normal strength/tone [No Rash] : no rash [No Skin Lesions] : no skin lesions [Normal Gait] : normal gait [Coordination Grossly Intact] : coordination grossly intact [No Focal Deficits] : no focal deficits [Speech Grossly Normal] : speech grossly normal [Deep Tendon Reflexes (DTR)] : deep tendon reflexes were 2+ and symmetric [Memory Grossly Normal] : memory grossly normal [Normal Affect] : the affect was normal [Alert and Oriented x3] : oriented to person, place, and time [Normal Mood] : the mood was normal [Normal Insight/Judgement] : insight and judgment were intact [Comprehensive Foot Exam Normal] : Right and left foot were examined and both feet are normal. No ulcers in either foot. Toes are normal and with full ROM.  Normal tactile sensation with monofilament testing throughout both feet

## 2019-04-18 NOTE — HEALTH RISK ASSESSMENT
[No falls in past year] : Patient reported no falls in the past year [] : No [[id number]] : 0 [0] : 1) Little interest or pleasure doing things: Not at all (0)

## 2019-06-24 ENCOUNTER — RX RENEWAL (OUTPATIENT)
Age: 69
End: 2019-06-24

## 2019-07-05 ENCOUNTER — RX RENEWAL (OUTPATIENT)
Age: 69
End: 2019-07-05

## 2019-07-30 ENCOUNTER — RX RENEWAL (OUTPATIENT)
Age: 69
End: 2019-07-30

## 2019-07-31 ENCOUNTER — APPOINTMENT (OUTPATIENT)
Dept: FAMILY MEDICINE | Facility: CLINIC | Age: 69
End: 2019-07-31

## 2019-09-23 ENCOUNTER — RX RENEWAL (OUTPATIENT)
Age: 69
End: 2019-09-23

## 2019-09-25 ENCOUNTER — RX RENEWAL (OUTPATIENT)
Age: 69
End: 2019-09-25

## 2019-10-03 ENCOUNTER — MEDICATION RENEWAL (OUTPATIENT)
Age: 69
End: 2019-10-03

## 2019-11-01 ENCOUNTER — RX RENEWAL (OUTPATIENT)
Age: 69
End: 2019-11-01

## 2019-11-01 ENCOUNTER — MEDICATION RENEWAL (OUTPATIENT)
Age: 69
End: 2019-11-01

## 2019-12-30 ENCOUNTER — RX RENEWAL (OUTPATIENT)
Age: 69
End: 2019-12-30

## 2020-01-04 ENCOUNTER — RX RENEWAL (OUTPATIENT)
Age: 70
End: 2020-01-04

## 2020-01-17 ENCOUNTER — APPOINTMENT (OUTPATIENT)
Dept: FAMILY MEDICINE | Facility: CLINIC | Age: 70
End: 2020-01-17
Payer: MEDICARE

## 2020-01-17 VITALS
OXYGEN SATURATION: 98 % | HEART RATE: 59 BPM | TEMPERATURE: 98 F | WEIGHT: 234 LBS | HEIGHT: 70 IN | DIASTOLIC BLOOD PRESSURE: 76 MMHG | SYSTOLIC BLOOD PRESSURE: 142 MMHG | BODY MASS INDEX: 33.5 KG/M2

## 2020-01-17 DIAGNOSIS — R05 COUGH: ICD-10-CM

## 2020-01-17 PROCEDURE — 99214 OFFICE O/P EST MOD 30 MIN: CPT | Mod: 25

## 2020-01-17 PROCEDURE — 36415 COLL VENOUS BLD VENIPUNCTURE: CPT

## 2020-01-17 NOTE — PHYSICAL EXAM
[No Acute Distress] : no acute distress [Well Nourished] : well nourished [Well-Appearing] : well-appearing [Normal Sclera/Conjunctiva] : normal sclera/conjunctiva [Well Developed] : well developed [EOMI] : extraocular movements intact [PERRL] : pupils equal round and reactive to light [No JVD] : no jugular venous distention [Normal Outer Ear/Nose] : the outer ears and nose were normal in appearance [Normal Oropharynx] : the oropharynx was normal [No Lymphadenopathy] : no lymphadenopathy [Supple] : supple [No Respiratory Distress] : no respiratory distress  [Thyroid Normal, No Nodules] : the thyroid was normal and there were no nodules present [Clear to Auscultation] : lungs were clear to auscultation bilaterally [No Accessory Muscle Use] : no accessory muscle use [Normal Rate] : normal rate  [No Murmur] : no murmur heard [Regular Rhythm] : with a regular rhythm [Normal S1, S2] : normal S1 and S2 [No Carotid Bruits] : no carotid bruits [No Abdominal Bruit] : a ~M bruit was not heard ~T in the abdomen [Pedal Pulses Present] : the pedal pulses are present [No Varicosities] : no varicosities [No Palpable Aorta] : no palpable aorta [No Extremity Clubbing/Cyanosis] : no extremity clubbing/cyanosis [Non Tender] : non-tender [Soft] : abdomen soft [Non-distended] : non-distended [Normal Bowel Sounds] : normal bowel sounds [No Masses] : no abdominal mass palpated [No HSM] : no HSM [No CVA Tenderness] : no CVA  tenderness [Normal Anterior Cervical Nodes] : no anterior cervical lymphadenopathy [Normal Posterior Cervical Nodes] : no posterior cervical lymphadenopathy [No Spinal Tenderness] : no spinal tenderness [No Joint Swelling] : no joint swelling [No Rash] : no rash [Grossly Normal Strength/Tone] : grossly normal strength/tone [Normal Gait] : normal gait [Coordination Grossly Intact] : coordination grossly intact [No Focal Deficits] : no focal deficits [Normal Insight/Judgement] : insight and judgment were intact [Deep Tendon Reflexes (DTR)] : deep tendon reflexes were 2+ and symmetric [Normal Affect] : the affect was normal [de-identified] : Bilateral lower extremity edema, left greater than right pitting, bilaterally. Patient does not tolerate support hose

## 2020-01-17 NOTE — COUNSELING
[Behavioral health counseling provided] : Behavioral health counseling provided [Sleep ___ hours/day] : Sleep [unfilled] hours/day [Engage in a relaxing activity] : Engage in a relaxing activity [Potential consequences of obesity discussed] : Potential consequences of obesity discussed [Plan in advance] : Plan in advance [Benefits of weight loss discussed] : Benefits of weight loss discussed [Structured Weight Management Program suggested:] : Structured weight management program suggested [Encouraged to maintain food diary] : Encouraged to maintain food diary [Encouraged to increase physical activity] : Encouraged to increase physical activity [Encouraged to use exercise tracking device] : Encouraged to use exercise tracking device [Target Wt Loss Goal ___] : Weight Loss Goals: Target weight loss goal [unfilled] lbs [Weigh Self Weekly] : weigh self weekly [____ min/wk Activity] : [unfilled] min/wk activity [Decrease Portions] : decrease portions [Keep Food Diary] : keep food diary [Patient motivation] : Patient motivation [Good understanding] : Patient has a good understanding of lifestyle changes and steps needed to achieve self management goal

## 2020-01-17 NOTE — REVIEW OF SYSTEMS
[Lower Ext Edema] : lower extremity edema [Cough] : cough [Dyspnea on Exertion] : dyspnea on exertion [Negative] : Psychiatric [Chest Pain] : no chest pain [Palpitations] : no palpitations [Orthopena] : no orthopnea [Shortness Of Breath] : no shortness of breath [Wheezing] : no wheezing

## 2020-01-17 NOTE — ASSESSMENT
[FreeTextEntry1] : Assessment and plan:\par \par 1. Cough may be postinfectious. Patient is on valsartan. Does not usually cause a cough, but he can detailed discussion with patient and wife. I will treat symptomatically with Tessalon Perles if cough persists, we may need to change medications.\par \par 2. Hypertension. Patient is on multiple medication and has been taking his medications as prescribed. Blood pressure is still slightly elevated. He checks his blood pressure on a regular basis at home. He says he averages about 130/80, which is acceptable.\par \par 3. Obesity. Detailed discussion with patient regarding weight loss program.\par \par 4. Diabetes mellitus, type II non-insulin-requiring continue no concentrated sweets, consistent carbohydrate diet, and metformin 500 mg p.o. twice a day. Comprehensive blood work drawn in office. I will check hemoglobin A1c.\par \par 5. Hyperlipidemia. Continue low-fat, low-cholesterol diet, and simvastatin 20 mg p.o. at bedtime. I also recommended increasing physical activity.\par \par 6. Comprehensive blood work obtained drawn in the office by examiner.\par \par 7. Patient declines all vaccinations including influenza vaccine.\par \par 8. History of prostate cancer. Patient would like to change urologist. Referral given.\par \par 9. Chronic obstructive pulmonary disease/obstructive sleep apnea. Patient states that he does not tolerate CPAP and has stopped Symbicort.

## 2020-01-17 NOTE — HISTORY OF PRESENT ILLNESS
[Spouse] : spouse [FreeTextEntry1] : Please see history of present illness [de-identified] : This is a 69-year-old gentleman presents today for followup and disease management. We will be addressing diabetes mellitus, type II, hyperlipidemia, hypertension, elevated BMI and peripheral vascular disease with chronic lower extremity edema. Unfortunately, patient does not tolerate support hose. Patient has history of obstructive sleep apnea was on CPAP. Unfortunately, patient doesn't feel any different with without therefore, patient has not been utilizing CPAP.\par \par Patient is complaining of a persistent cough. No mucus production cough worsens at night.

## 2020-01-17 NOTE — HEALTH RISK ASSESSMENT

## 2020-01-18 LAB
ALBUMIN SERPL ELPH-MCNC: 4.7 G/DL
ALP BLD-CCNC: 53 U/L
ALT SERPL-CCNC: 25 U/L
ANION GAP SERPL CALC-SCNC: 14 MMOL/L
APPEARANCE: CLEAR
AST SERPL-CCNC: 17 U/L
BACTERIA: NEGATIVE
BASOPHILS # BLD AUTO: 0.04 K/UL
BASOPHILS NFR BLD AUTO: 0.4 %
BILIRUB SERPL-MCNC: 0.4 MG/DL
BILIRUBIN URINE: NEGATIVE
BLOOD URINE: NEGATIVE
BUN SERPL-MCNC: 16 MG/DL
CALCIUM SERPL-MCNC: 9.6 MG/DL
CHLORIDE SERPL-SCNC: 100 MMOL/L
CHOLEST SERPL-MCNC: 155 MG/DL
CHOLEST/HDLC SERPL: 5.4 RATIO
CO2 SERPL-SCNC: 28 MMOL/L
COLOR: NORMAL
CREAT SERPL-MCNC: 0.91 MG/DL
CREAT SPEC-SCNC: 90 MG/DL
EOSINOPHIL # BLD AUTO: 0.28 K/UL
EOSINOPHIL NFR BLD AUTO: 3.1 %
ESTIMATED AVERAGE GLUCOSE: 146 MG/DL
GLUCOSE QUALITATIVE U: NEGATIVE
GLUCOSE SERPL-MCNC: 139 MG/DL
HBA1C MFR BLD HPLC: 6.7 %
HCT VFR BLD CALC: 44.9 %
HDLC SERPL-MCNC: 29 MG/DL
HGB BLD-MCNC: 14.6 G/DL
HYALINE CASTS: 1 /LPF
IMM GRANULOCYTES NFR BLD AUTO: 0.2 %
KETONES URINE: NEGATIVE
LDLC SERPL CALC-MCNC: 83 MG/DL
LEUKOCYTE ESTERASE URINE: NEGATIVE
LYMPHOCYTES # BLD AUTO: 2.21 K/UL
LYMPHOCYTES NFR BLD AUTO: 24.6 %
MAN DIFF?: NORMAL
MCHC RBC-ENTMCNC: 28.9 PG
MCHC RBC-ENTMCNC: 32.5 GM/DL
MCV RBC AUTO: 88.7 FL
MICROALBUMIN 24H UR DL<=1MG/L-MCNC: <1.2 MG/DL
MICROALBUMIN/CREAT 24H UR-RTO: NORMAL MG/G
MICROSCOPIC-UA: NORMAL
MONOCYTES # BLD AUTO: 0.79 K/UL
MONOCYTES NFR BLD AUTO: 8.8 %
NEUTROPHILS # BLD AUTO: 5.64 K/UL
NEUTROPHILS NFR BLD AUTO: 62.9 %
NITRITE URINE: NEGATIVE
PH URINE: 5
PLATELET # BLD AUTO: 249 K/UL
POTASSIUM SERPL-SCNC: 3.7 MMOL/L
PROT SERPL-MCNC: 7.1 G/DL
PROTEIN URINE: NEGATIVE
PSA SERPL-MCNC: 2.85 NG/ML
RBC # BLD: 5.06 M/UL
RBC # FLD: 12.8 %
RED BLOOD CELLS URINE: 1 /HPF
SODIUM SERPL-SCNC: 141 MMOL/L
SPECIFIC GRAVITY URINE: 1.02
SQUAMOUS EPITHELIAL CELLS: 0 /HPF
T4 FREE SERPL-MCNC: 1.3 NG/DL
TRIGL SERPL-MCNC: 215 MG/DL
TSH SERPL-ACNC: 2.42 UIU/ML
UROBILINOGEN URINE: NORMAL
WBC # FLD AUTO: 8.98 K/UL
WHITE BLOOD CELLS URINE: 1 /HPF

## 2020-03-25 ENCOUNTER — RX RENEWAL (OUTPATIENT)
Age: 70
End: 2020-03-25

## 2020-07-01 ENCOUNTER — APPOINTMENT (OUTPATIENT)
Dept: FAMILY MEDICINE | Facility: CLINIC | Age: 70
End: 2020-07-01

## 2020-07-09 ENCOUNTER — RX RENEWAL (OUTPATIENT)
Age: 70
End: 2020-07-09

## 2020-07-10 ENCOUNTER — RX RENEWAL (OUTPATIENT)
Age: 70
End: 2020-07-10

## 2020-09-04 ENCOUNTER — APPOINTMENT (OUTPATIENT)
Dept: FAMILY MEDICINE | Facility: CLINIC | Age: 70
End: 2020-09-04
Payer: MEDICARE

## 2020-09-04 VITALS
HEIGHT: 70 IN | WEIGHT: 240 LBS | DIASTOLIC BLOOD PRESSURE: 80 MMHG | SYSTOLIC BLOOD PRESSURE: 148 MMHG | RESPIRATION RATE: 12 BRPM | HEART RATE: 75 BPM | BODY MASS INDEX: 34.36 KG/M2 | TEMPERATURE: 98.6 F | OXYGEN SATURATION: 98 %

## 2020-09-04 PROCEDURE — 99214 OFFICE O/P EST MOD 30 MIN: CPT | Mod: 25

## 2020-09-04 PROCEDURE — 36415 COLL VENOUS BLD VENIPUNCTURE: CPT

## 2020-09-04 NOTE — HISTORY OF PRESENT ILLNESS
[FreeTextEntry1] : See HPI [de-identified] : Patient is a 70-year-old gentleman who presents today for follow-up and disease management patient states that he is in his usual state of health presently denies any chest pain shortness of breath nausea or vomiting cough has improved in fact rarely occurs.\par \par Patient's medical history is significant for bladder cancer presently followed by urology, diabetes mellitus type 2 non-insulin-requiring, hyperlipidemia, hypertension, elevated BMI obstructive sleep apnea, and peripheral vascular disease with chronic lower extremity edema.\par \par Presently the patient is awake alert and oriented x3 in no acute distress calm and cooperative at this visit I will be obtaining comprehensive blood work.

## 2020-09-04 NOTE — COUNSELING
[No throw rugs] : No throw rugs [Fall prevention counseling provided] : Fall prevention counseling provided [Adequate lighting] : Adequate lighting [Use proper foot wear] : Use proper foot wear [Behavioral health counseling provided] : Behavioral health counseling provided [Sleep ___ hours/day] : Sleep [unfilled] hours/day [Engage in a relaxing activity] : Engage in a relaxing activity [Plan in advance] : Plan in advance [Potential consequences of obesity discussed] : Potential consequences of obesity discussed [AUDIT-C Screening administered and reviewed] : AUDIT-C Screening administered and reviewed [Structured Weight Management Program suggested:] : Structured weight management program suggested [Benefits of weight loss discussed] : Benefits of weight loss discussed [Encouraged to increase physical activity] : Encouraged to increase physical activity [Encouraged to use exercise tracking device] : Encouraged to use exercise tracking device [Encouraged to maintain food diary] : Encouraged to maintain food diary [Target Wt Loss Goal ___] : Weight Loss Goals: Target weight loss goal [unfilled] lbs [Weigh Self Weekly] : weigh self weekly [Decrease Portions] : decrease portions [____ min/wk Activity] : [unfilled] min/wk activity [Keep Food Diary] : keep food diary

## 2020-09-04 NOTE — REVIEW OF SYSTEMS
[Lower Ext Edema] : lower extremity edema [Dyspnea on Exertion] : dyspnea on exertion [Negative] : Heme/Lymph [Chest Pain] : no chest pain [Palpitations] : no palpitations [Orthopena] : no orthopnea [Shortness Of Breath] : no shortness of breath [Wheezing] : no wheezing [Cough] : no cough

## 2020-09-04 NOTE — ASSESSMENT
[FreeTextEntry1] : Assessment and plan:\par \par 1. Cough has resolved.\par \par 2. Hypertension. Patient is on multiple medication and has been taking his medications as prescribed. Blood pressure is still slightly elevated. He checks his blood pressure on a regular basis at home. He says he averages about 130/80, which is acceptable.\par \par 3. Obesity. Detailed discussion with patient regarding weight loss program.  Goal is 40 pound weight loss.\par \par 4. Diabetes mellitus, type II non-insulin-requiring continue no concentrated sweets, consistent carbohydrate diet, and metformin 500 mg p.o. twice a day. Comprehensive blood work drawn in office. I will check hemoglobin A1c.\par \par 5. Hyperlipidemia. Continue low-fat, low-cholesterol diet, and simvastatin 20 mg p.o. at bedtime. I also recommended increasing physical activity.\par \par 6. Comprehensive blood work obtained drawn in the office by examiner.\par \par 7. Patient declines all vaccinations including influenza vaccine.\par \par 8. History of bladder cancer patient has been following up with urology most recent note reviewed.\par \par 9. Chronic obstructive pulmonary disease/obstructive sleep apnea. Patient states that he does not tolerate CPAP and has stopped Symbicort.\par \par 10.  Continue present medical management without change.

## 2020-09-04 NOTE — PHYSICAL EXAM
[No Acute Distress] : no acute distress [Well Nourished] : well nourished [Well Developed] : well developed [Normal Sclera/Conjunctiva] : normal sclera/conjunctiva [Well-Appearing] : well-appearing [PERRL] : pupils equal round and reactive to light [EOMI] : extraocular movements intact [Normal Outer Ear/Nose] : the outer ears and nose were normal in appearance [Normal Oropharynx] : the oropharynx was normal [No JVD] : no jugular venous distention [No Lymphadenopathy] : no lymphadenopathy [Supple] : supple [Thyroid Normal, No Nodules] : the thyroid was normal and there were no nodules present [No Respiratory Distress] : no respiratory distress  [Clear to Auscultation] : lungs were clear to auscultation bilaterally [No Accessory Muscle Use] : no accessory muscle use [Normal Rate] : normal rate  [Regular Rhythm] : with a regular rhythm [Normal S1, S2] : normal S1 and S2 [No Murmur] : no murmur heard [No Carotid Bruits] : no carotid bruits [No Abdominal Bruit] : a ~M bruit was not heard ~T in the abdomen [No Varicosities] : no varicosities [Pedal Pulses Present] : the pedal pulses are present [No Palpable Aorta] : no palpable aorta [Soft] : abdomen soft [No Extremity Clubbing/Cyanosis] : no extremity clubbing/cyanosis [Non Tender] : non-tender [Non-distended] : non-distended [No Masses] : no abdominal mass palpated [No HSM] : no HSM [Normal Bowel Sounds] : normal bowel sounds [Normal Posterior Cervical Nodes] : no posterior cervical lymphadenopathy [Normal Anterior Cervical Nodes] : no anterior cervical lymphadenopathy [No CVA Tenderness] : no CVA  tenderness [No Spinal Tenderness] : no spinal tenderness [No Joint Swelling] : no joint swelling [Grossly Normal Strength/Tone] : grossly normal strength/tone [No Rash] : no rash [Coordination Grossly Intact] : coordination grossly intact [Normal Gait] : normal gait [No Focal Deficits] : no focal deficits [Deep Tendon Reflexes (DTR)] : deep tendon reflexes were 2+ and symmetric [Normal Affect] : the affect was normal [Normal Insight/Judgement] : insight and judgment were intact [de-identified] : Bilateral lower extremity edema, left greater than right pitting, bilaterally. Patient does not tolerate support hose

## 2020-09-04 NOTE — HEALTH RISK ASSESSMENT
[Yes] : Yes [2 - 4 times a month (2 pts)] : 2-4 times a month (2 points) [1 or 2 (0 pts)] : 1 or 2 (0 points) [Never (0 pts)] : Never (0 points) [No] : In the past 12 months have you used drugs other than those required for medical reasons? No [No falls in past year] : Patient reported no falls in the past year [0] : 2) Feeling down, depressed, or hopeless: Not at all (0) [] : No [Audit-CScore] : 2 [[id number]] : 0

## 2020-09-07 LAB
ALBUMIN SERPL ELPH-MCNC: 4.8 G/DL
ALP BLD-CCNC: 55 U/L
ALT SERPL-CCNC: 31 U/L
ANION GAP SERPL CALC-SCNC: 15 MMOL/L
AST SERPL-CCNC: 22 U/L
BASOPHILS # BLD AUTO: 0.06 K/UL
BASOPHILS NFR BLD AUTO: 0.6 %
BILIRUB SERPL-MCNC: 0.4 MG/DL
BUN SERPL-MCNC: 18 MG/DL
CALCIUM SERPL-MCNC: 9.9 MG/DL
CHLORIDE SERPL-SCNC: 100 MMOL/L
CHOLEST SERPL-MCNC: 158 MG/DL
CHOLEST/HDLC SERPL: 5.2 RATIO
CO2 SERPL-SCNC: 28 MMOL/L
CREAT SERPL-MCNC: 0.9 MG/DL
EOSINOPHIL # BLD AUTO: 0.23 K/UL
EOSINOPHIL NFR BLD AUTO: 2.4 %
ESTIMATED AVERAGE GLUCOSE: 171 MG/DL
GLUCOSE SERPL-MCNC: 209 MG/DL
HBA1C MFR BLD HPLC: 7.6 %
HCT VFR BLD CALC: 45.6 %
HDLC SERPL-MCNC: 31 MG/DL
HGB BLD-MCNC: 15.1 G/DL
IMM GRANULOCYTES NFR BLD AUTO: 0.2 %
LDLC SERPL CALC-MCNC: 72 MG/DL
LYMPHOCYTES # BLD AUTO: 2.14 K/UL
LYMPHOCYTES NFR BLD AUTO: 21.9 %
MAN DIFF?: NORMAL
MCHC RBC-ENTMCNC: 29.2 PG
MCHC RBC-ENTMCNC: 33.1 GM/DL
MCV RBC AUTO: 88 FL
MONOCYTES # BLD AUTO: 0.74 K/UL
MONOCYTES NFR BLD AUTO: 7.6 %
NEUTROPHILS # BLD AUTO: 6.57 K/UL
NEUTROPHILS NFR BLD AUTO: 67.3 %
PLATELET # BLD AUTO: 230 K/UL
POTASSIUM SERPL-SCNC: 3.5 MMOL/L
PROT SERPL-MCNC: 7.2 G/DL
RBC # BLD: 5.18 M/UL
RBC # FLD: 12.6 %
SODIUM SERPL-SCNC: 142 MMOL/L
T4 FREE SERPL-MCNC: 1.4 NG/DL
TRIGL SERPL-MCNC: 276 MG/DL
TSH SERPL-ACNC: 1.6 UIU/ML
WBC # FLD AUTO: 9.76 K/UL

## 2020-12-01 ENCOUNTER — APPOINTMENT (OUTPATIENT)
Dept: FAMILY MEDICINE | Facility: CLINIC | Age: 70
End: 2020-12-01
Payer: MEDICARE

## 2020-12-01 VITALS
DIASTOLIC BLOOD PRESSURE: 82 MMHG | RESPIRATION RATE: 14 BRPM | HEIGHT: 70 IN | SYSTOLIC BLOOD PRESSURE: 156 MMHG | BODY MASS INDEX: 34.07 KG/M2 | HEART RATE: 82 BPM | OXYGEN SATURATION: 96 % | TEMPERATURE: 206.96 F | WEIGHT: 238 LBS

## 2020-12-01 PROCEDURE — 99072 ADDL SUPL MATRL&STAF TM PHE: CPT

## 2020-12-01 PROCEDURE — 99214 OFFICE O/P EST MOD 30 MIN: CPT | Mod: 25

## 2020-12-01 PROCEDURE — 36415 COLL VENOUS BLD VENIPUNCTURE: CPT

## 2020-12-01 NOTE — HISTORY OF PRESENT ILLNESS
[FreeTextEntry1] : See HPI [de-identified] : Patient is a 70-year-old gentleman who presents today for follow-up and disease management.  Patient states that he is in his usual state of health he does admit to some dietary indiscretions especially during the holiday season and with the pandemic.\par \par Medical history is significant for hypertension, elevated BMI, diabetes mellitus type 2 with peripheral vascular disease well controlled, hyperlipidemia, obstructive sleep apnea patient does not utilize CPAP states that he sleeps in an upright position and feels much better unfortunately has not tolerated CPAP.  Patient also has history of bladder cancer presently in remission and is followed extremely closely by urology.\par \par Patient is awake alert and oriented x3 in no acute distress calm and cooperative he denies any chest pain or shortness of breath at this time.

## 2020-12-01 NOTE — PHYSICAL EXAM
[No Acute Distress] : no acute distress [Well Nourished] : well nourished [Well Developed] : well developed [Well-Appearing] : well-appearing [Normal Sclera/Conjunctiva] : normal sclera/conjunctiva [PERRL] : pupils equal round and reactive to light [EOMI] : extraocular movements intact [Normal Outer Ear/Nose] : the outer ears and nose were normal in appearance [Normal Oropharynx] : the oropharynx was normal [No JVD] : no jugular venous distention [No Lymphadenopathy] : no lymphadenopathy [Supple] : supple [Thyroid Normal, No Nodules] : the thyroid was normal and there were no nodules present [No Respiratory Distress] : no respiratory distress  [No Accessory Muscle Use] : no accessory muscle use [Clear to Auscultation] : lungs were clear to auscultation bilaterally [Normal Rate] : normal rate  [Regular Rhythm] : with a regular rhythm [Normal S1, S2] : normal S1 and S2 [No Murmur] : no murmur heard [No Carotid Bruits] : no carotid bruits [No Abdominal Bruit] : a ~M bruit was not heard ~T in the abdomen [No Varicosities] : no varicosities [Pedal Pulses Present] : the pedal pulses are present [No Palpable Aorta] : no palpable aorta [No Extremity Clubbing/Cyanosis] : no extremity clubbing/cyanosis [Soft] : abdomen soft [Non Tender] : non-tender [Non-distended] : non-distended [No Masses] : no abdominal mass palpated [No HSM] : no HSM [Normal Bowel Sounds] : normal bowel sounds [Normal Posterior Cervical Nodes] : no posterior cervical lymphadenopathy [Normal Anterior Cervical Nodes] : no anterior cervical lymphadenopathy [No CVA Tenderness] : no CVA  tenderness [No Spinal Tenderness] : no spinal tenderness [No Joint Swelling] : no joint swelling [Grossly Normal Strength/Tone] : grossly normal strength/tone [No Rash] : no rash [Coordination Grossly Intact] : coordination grossly intact [No Focal Deficits] : no focal deficits [Normal Gait] : normal gait [Deep Tendon Reflexes (DTR)] : deep tendon reflexes were 2+ and symmetric [Speech Grossly Normal] : speech grossly normal [Memory Grossly Normal] : memory grossly normal [Normal Affect] : the affect was normal [Alert and Oriented x3] : oriented to person, place, and time [Normal Mood] : the mood was normal [Normal Insight/Judgement] : insight and judgment were intact [de-identified] : Bilateral lower extremity edema, left greater than right pitting, bilaterally. Patient does not tolerate support hose [de-identified] : edema no sores

## 2020-12-01 NOTE — HEALTH RISK ASSESSMENT
[] : No [2 - 4 times a month (2 pts)] : 2-4 times a month (2 points) [1 or 2 (0 pts)] : 1 or 2 (0 points) [Never (0 pts)] : Never (0 points) [No] : In the past 12 months have you used drugs other than those required for medical reasons? No [No falls in past year] : Patient reported no falls in the past year [0] : 2) Feeling down, depressed, or hopeless: Not at all (0) [Audit-CScore] : 2 [[id number]] : 0

## 2020-12-01 NOTE — REVIEW OF SYSTEMS
[Chest Pain] : no chest pain [Palpitations] : no palpitations [Lower Ext Edema] : lower extremity edema [Orthopena] : no orthopnea [Shortness Of Breath] : no shortness of breath [Wheezing] : no wheezing [Cough] : no cough [Dyspnea on Exertion] : dyspnea on exertion [Negative] : Heme/Lymph

## 2020-12-01 NOTE — ASSESSMENT
[FreeTextEntry1] : Assessment and plan:\par \par 1.  Comprehensive exam is unremarkable no acute findings peripheral vascular disease/diabetes mellitus type 2 stable no new findings recommend patient continue support hose.\par \par 2. Hypertension. Patient is on multiple medication and has been taking his medications as prescribed. Blood pressure is still slightly elevated. He checks his blood pressure on a regular basis at home. He says he averages about 130/80, which is acceptable.  I recommend that patient continue home blood pressure monitoring seeing that the majority of the time here in the office the blood pressure runs on the high side.\par \par 3. Obesity. Detailed discussion with patient regarding weight loss program.  Goal is 40 pound weight loss.  Patient did lose 2 pounds since his last visit.\par \par 4. Diabetes mellitus, type II non-insulin-requiring continue no concentrated sweets, consistent carbohydrate diet, and metformin 500 mg p.o. twice a day. Comprehensive blood work drawn in office. I will check hemoglobin A1c.\par \par 5. Hyperlipidemia. Continue low-fat, low-cholesterol diet, and simvastatin 20 mg p.o. at bedtime. I also recommended increasing physical activity.\par \par 6. Comprehensive blood work obtained drawn in the office by examiner.\par \par 7. Patient declines all vaccinations including influenza vaccine.\par \par 8. History of bladder cancer patient has been following up with urology most recent note reviewed.\par \par 9. Chronic obstructive pulmonary disease/obstructive sleep apnea. Patient states that he does not tolerate CPAP and has stopped Symbicort.\par \par 10.  Continue present medical management without change.

## 2020-12-01 NOTE — COUNSELING

## 2020-12-02 LAB
ALBUMIN SERPL ELPH-MCNC: 4.5 G/DL
ALP BLD-CCNC: 62 U/L
ALT SERPL-CCNC: 25 U/L
ANION GAP SERPL CALC-SCNC: 15 MMOL/L
AST SERPL-CCNC: 16 U/L
BASOPHILS # BLD AUTO: 0.04 K/UL
BASOPHILS NFR BLD AUTO: 0.5 %
BILIRUB SERPL-MCNC: 0.4 MG/DL
BUN SERPL-MCNC: 15 MG/DL
CALCIUM SERPL-MCNC: 10 MG/DL
CHLORIDE SERPL-SCNC: 98 MMOL/L
CHOLEST SERPL-MCNC: 139 MG/DL
CO2 SERPL-SCNC: 25 MMOL/L
CREAT SERPL-MCNC: 0.88 MG/DL
EOSINOPHIL # BLD AUTO: 0.2 K/UL
EOSINOPHIL NFR BLD AUTO: 2.4 %
ESTIMATED AVERAGE GLUCOSE: 177 MG/DL
GLUCOSE SERPL-MCNC: 216 MG/DL
HBA1C MFR BLD HPLC: 7.8 %
HCT VFR BLD CALC: 44.5 %
HDLC SERPL-MCNC: 31 MG/DL
HGB BLD-MCNC: 14.8 G/DL
IMM GRANULOCYTES NFR BLD AUTO: 0.4 %
LDLC SERPL CALC-MCNC: 69 MG/DL
LYMPHOCYTES # BLD AUTO: 1.73 K/UL
LYMPHOCYTES NFR BLD AUTO: 21.1 %
MAN DIFF?: NORMAL
MCHC RBC-ENTMCNC: 29.4 PG
MCHC RBC-ENTMCNC: 33.3 GM/DL
MCV RBC AUTO: 88.3 FL
MONOCYTES # BLD AUTO: 0.63 K/UL
MONOCYTES NFR BLD AUTO: 7.7 %
NEUTROPHILS # BLD AUTO: 5.55 K/UL
NEUTROPHILS NFR BLD AUTO: 67.9 %
NONHDLC SERPL-MCNC: 109 MG/DL
PLATELET # BLD AUTO: 232 K/UL
POTASSIUM SERPL-SCNC: 3.6 MMOL/L
PROT SERPL-MCNC: 7 G/DL
RBC # BLD: 5.04 M/UL
RBC # FLD: 12.8 %
SODIUM SERPL-SCNC: 139 MMOL/L
T4 FREE SERPL-MCNC: 1.5 NG/DL
TRIGL SERPL-MCNC: 198 MG/DL
TSH SERPL-ACNC: 1.92 UIU/ML
WBC # FLD AUTO: 8.18 K/UL

## 2021-03-05 ENCOUNTER — APPOINTMENT (OUTPATIENT)
Dept: FAMILY MEDICINE | Facility: CLINIC | Age: 71
End: 2021-03-05
Payer: MEDICARE

## 2021-03-05 VITALS
DIASTOLIC BLOOD PRESSURE: 70 MMHG | BODY MASS INDEX: 32.93 KG/M2 | TEMPERATURE: 205.34 F | SYSTOLIC BLOOD PRESSURE: 148 MMHG | WEIGHT: 230 LBS | HEIGHT: 70 IN | OXYGEN SATURATION: 94 % | RESPIRATION RATE: 14 BRPM | HEART RATE: 64 BPM

## 2021-03-05 PROCEDURE — 99215 OFFICE O/P EST HI 40 MIN: CPT | Mod: 25

## 2021-03-05 PROCEDURE — 36415 COLL VENOUS BLD VENIPUNCTURE: CPT

## 2021-03-05 PROCEDURE — 99072 ADDL SUPL MATRL&STAF TM PHE: CPT

## 2021-03-05 RX ORDER — BENZONATATE 200 MG/1
200 CAPSULE ORAL 3 TIMES DAILY
Qty: 21 | Refills: 1 | Status: COMPLETED | COMMUNITY
Start: 2020-01-17 | End: 2021-03-05

## 2021-03-05 NOTE — ASSESSMENT
[FreeTextEntry1] : Assessment and plan:\par \par 1.  Physical exam was unremarkable no acute findings stable for patient.\par \par 2. Hypertension. Patient is on multiple medication and has been taking his medications as prescribed. Blood pressure is still slightly elevated. He checks his blood pressure on a regular basis at home. He says he averages about 130/80, which is acceptable.  I recommend that patient continue home blood pressure monitoring seeing that the majority of the time here in the office the blood pressure runs on the high side.  Today's blood pressure in office 130/70 excellent control.\par \par 3. Obesity. Detailed discussion with patient regarding weight loss program.  Goal is 40 pound weight loss.  Patient did lose 8 pounds since his last visit.  I encouraged the patient to continue doing what he is doing he has lost a total of 10 pounds 8 pounds since his last visit.\par \par 4. Diabetes mellitus, type II non-insulin-requiring continue no concentrated sweets, consistent carbohydrate diet, and metformin 500 mg p.o. twice a day. Comprehensive blood work drawn in office. I will check hemoglobin A1c.  His previous hemoglobin A1c was 7.8 which is stable for patient patient has been following as stated earlier diet and has been taking medications.\par \par 5. Hyperlipidemia. Continue low-fat, low-cholesterol diet, and simvastatin 20 mg p.o. at bedtime. I also recommended increasing physical activity.\par \par 6.  History of colon cancer patient is due for follow-up colonoscopy referral given.\par \par 7. Patient declines all vaccinations including influenza vaccine.  Patient is up-to-date with COVID-19 vaccination he received both vaccines of Moderna.\par \par 8. History of bladder cancer patient has been following up with urology most recent note reviewed.\par \par 9. Chronic obstructive pulmonary disease/obstructive sleep apnea. Patient states that he does not tolerate CPAP and has stopped Symbicort.\par \par 10.  Continue present medical management without change.  Spent 50 minutes on patient I reviewed with patient his most recent colonoscopy and findings which was done in 2017 he was supposed to have a colonoscopy in 2020 but due to the pandemic he is several months behind I recommend that he have his colonoscopy because of the history of colon cancer.  I discussed with patient most recent follow-up appointment that he had with urology patient does have history of bladder cancer presently in remission and doing well.  At this visit I obtain comprehensive blood work which will include urine analysis.

## 2021-03-05 NOTE — HEALTH RISK ASSESSMENT
[Yes] : Yes [Monthly or less (1 pt)] : Monthly or less (1 point) [1 or 2 (0 pts)] : 1 or 2 (0 points) [Never (0 pts)] : Never (0 points) [No] : In the past 12 months have you used drugs other than those required for medical reasons? No [No falls in past year] : Patient reported no falls in the past year [0] : 2) Feeling down, depressed, or hopeless: Not at all (0) [] : No [Audit-CScore] : 1 [[id number]] : 0

## 2021-03-05 NOTE — PHYSICAL EXAM
[No Acute Distress] : no acute distress [Well Nourished] : well nourished [Well Developed] : well developed [Well-Appearing] : well-appearing [Normal Sclera/Conjunctiva] : normal sclera/conjunctiva [PERRL] : pupils equal round and reactive to light [EOMI] : extraocular movements intact [Normal Outer Ear/Nose] : the outer ears and nose were normal in appearance [Normal Oropharynx] : the oropharynx was normal [No JVD] : no jugular venous distention [No Lymphadenopathy] : no lymphadenopathy [Supple] : supple [Thyroid Normal, No Nodules] : the thyroid was normal and there were no nodules present [No Respiratory Distress] : no respiratory distress  [No Accessory Muscle Use] : no accessory muscle use [Clear to Auscultation] : lungs were clear to auscultation bilaterally [Normal Rate] : normal rate  [Regular Rhythm] : with a regular rhythm [Normal S1, S2] : normal S1 and S2 [No Murmur] : no murmur heard [No Carotid Bruits] : no carotid bruits [No Abdominal Bruit] : a ~M bruit was not heard ~T in the abdomen [No Varicosities] : no varicosities [Pedal Pulses Present] : the pedal pulses are present [No Palpable Aorta] : no palpable aorta [No Extremity Clubbing/Cyanosis] : no extremity clubbing/cyanosis [Soft] : abdomen soft [Non Tender] : non-tender [Non-distended] : non-distended [No Masses] : no abdominal mass palpated [No HSM] : no HSM [Normal Bowel Sounds] : normal bowel sounds [Normal Posterior Cervical Nodes] : no posterior cervical lymphadenopathy [Normal Anterior Cervical Nodes] : no anterior cervical lymphadenopathy [No CVA Tenderness] : no CVA  tenderness [No Spinal Tenderness] : no spinal tenderness [No Joint Swelling] : no joint swelling [Grossly Normal Strength/Tone] : grossly normal strength/tone [No Rash] : no rash [Coordination Grossly Intact] : coordination grossly intact [No Focal Deficits] : no focal deficits [Normal Gait] : normal gait [Deep Tendon Reflexes (DTR)] : deep tendon reflexes were 2+ and symmetric [Speech Grossly Normal] : speech grossly normal [Memory Grossly Normal] : memory grossly normal [Normal Affect] : the affect was normal [Alert and Oriented x3] : oriented to person, place, and time [Normal Mood] : the mood was normal [Normal Insight/Judgement] : insight and judgment were intact [Comprehensive Foot Exam Normal] : Right and left foot were examined and both feet are normal. No ulcers in either foot. Toes are normal and with full ROM.  Normal tactile sensation with monofilament testing throughout both feet [de-identified] : Bilateral lower extremity edema, left greater than right pitting, bilaterally. Patient does not tolerate support hose [de-identified] : edema no sores

## 2021-03-05 NOTE — HISTORY OF PRESENT ILLNESS
[FreeTextEntry1] : See HPI. [de-identified] : Is a 71-year-old gentleman who presents today for follow-up and disease management patient states that he is been in his usual state of health he presently denies any chest pain or shortness of breath.\par \par At today's visit we will be addressing hypertension, elevated BMI, diabetes mellitus type 2 non-insulin-requiring, hyperlipidemia, history of bladder cancer followed very closely by urology, obstructive sleep apnea, peripheral vascular disease and patient also has history of colon cancer he is due for his colonoscopy and we could stated the patient is doing very good and is in remission at this time.\par \par Patient is up-to-date with COVID-19 vaccination he has received both vaccines which were Moderna first vaccine was administered good on January 21, 2021 the second vaccine was administered on February 20, 2021.

## 2021-03-08 LAB
ALBUMIN SERPL ELPH-MCNC: 4.5 G/DL
ALP BLD-CCNC: 60 U/L
ALT SERPL-CCNC: 23 U/L
ANION GAP SERPL CALC-SCNC: 12 MMOL/L
APPEARANCE: CLEAR
AST SERPL-CCNC: 18 U/L
BACTERIA: NEGATIVE
BASOPHILS # BLD AUTO: 0.05 K/UL
BASOPHILS NFR BLD AUTO: 0.6 %
BILIRUB SERPL-MCNC: 0.3 MG/DL
BILIRUBIN URINE: NEGATIVE
BLOOD URINE: NEGATIVE
BUN SERPL-MCNC: 22 MG/DL
CALCIUM SERPL-MCNC: 9.6 MG/DL
CHLORIDE SERPL-SCNC: 100 MMOL/L
CHOLEST SERPL-MCNC: 143 MG/DL
CO2 SERPL-SCNC: 29 MMOL/L
COLOR: YELLOW
CREAT SERPL-MCNC: 1 MG/DL
EOSINOPHIL # BLD AUTO: 0.24 K/UL
EOSINOPHIL NFR BLD AUTO: 2.7 %
ESTIMATED AVERAGE GLUCOSE: 163 MG/DL
GLUCOSE QUALITATIVE U: NEGATIVE
GLUCOSE SERPL-MCNC: 188 MG/DL
HBA1C MFR BLD HPLC: 7.3 %
HCT VFR BLD CALC: 47.4 %
HDLC SERPL-MCNC: 26 MG/DL
HGB BLD-MCNC: 15.4 G/DL
HYALINE CASTS: 0 /LPF
IMM GRANULOCYTES NFR BLD AUTO: 0.2 %
KETONES URINE: NEGATIVE
LDLC SERPL CALC-MCNC: 65 MG/DL
LEUKOCYTE ESTERASE URINE: NEGATIVE
LYMPHOCYTES # BLD AUTO: 2.13 K/UL
LYMPHOCYTES NFR BLD AUTO: 23.8 %
MAN DIFF?: NORMAL
MCHC RBC-ENTMCNC: 28.7 PG
MCHC RBC-ENTMCNC: 32.5 GM/DL
MCV RBC AUTO: 88.4 FL
MICROSCOPIC-UA: NORMAL
MONOCYTES # BLD AUTO: 0.85 K/UL
MONOCYTES NFR BLD AUTO: 9.5 %
NEUTROPHILS # BLD AUTO: 5.65 K/UL
NEUTROPHILS NFR BLD AUTO: 63.2 %
NITRITE URINE: NEGATIVE
NONHDLC SERPL-MCNC: 117 MG/DL
PH URINE: 6.5
PLATELET # BLD AUTO: 242 K/UL
POTASSIUM SERPL-SCNC: 3.6 MMOL/L
PROT SERPL-MCNC: 7.3 G/DL
PROTEIN URINE: NORMAL
PSA SERPL-MCNC: 3.17 NG/ML
RBC # BLD: 5.36 M/UL
RBC # FLD: 12.8 %
RED BLOOD CELLS URINE: 5 /HPF
SODIUM SERPL-SCNC: 141 MMOL/L
SPECIFIC GRAVITY URINE: 1.03
SQUAMOUS EPITHELIAL CELLS: 0 /HPF
T4 FREE SERPL-MCNC: 1.3 NG/DL
TRIGL SERPL-MCNC: 258 MG/DL
TSH SERPL-ACNC: 1.33 UIU/ML
UROBILINOGEN URINE: NORMAL
WBC # FLD AUTO: 8.94 K/UL
WHITE BLOOD CELLS URINE: 1 /HPF

## 2021-03-10 LAB
CREAT SPEC-SCNC: 229 MG/DL
MICROALBUMIN 24H UR DL<=1MG/L-MCNC: 1.6 MG/DL
MICROALBUMIN/CREAT 24H UR-RTO: 7 MG/G

## 2021-03-29 ENCOUNTER — APPOINTMENT (OUTPATIENT)
Dept: SURGERY | Facility: CLINIC | Age: 71
End: 2021-03-29
Payer: MEDICARE

## 2021-03-29 VITALS — HEIGHT: 70 IN | WEIGHT: 235 LBS | BODY MASS INDEX: 33.64 KG/M2 | TEMPERATURE: 206.96 F

## 2021-03-29 DIAGNOSIS — Z12.11 ENCOUNTER FOR SCREENING FOR MALIGNANT NEOPLASM OF COLON: ICD-10-CM

## 2021-03-29 DIAGNOSIS — Z12.12 ENCOUNTER FOR SCREENING FOR MALIGNANT NEOPLASM OF COLON: ICD-10-CM

## 2021-03-29 PROCEDURE — 99072 ADDL SUPL MATRL&STAF TM PHE: CPT

## 2021-03-29 PROCEDURE — 99203 OFFICE O/P NEW LOW 30 MIN: CPT

## 2021-03-30 NOTE — PHYSICAL EXAM
[Normal Heart Sounds] : normal heart sounds [Normal Rate and Rhythm] : normal rate and rhythm [No Rash or Lesion] : No rash or lesion [Abdominal Masses] : No abdominal masses [Abdomen Tenderness] : ~T ~M No abdominal tenderness [de-identified] : nl [de-identified] : nl [de-identified] : nl

## 2021-03-30 NOTE — ASSESSMENT
[FreeTextEntry1] : Long discussion regarding all options and risks\par Bowel prep written and explained\par Scheduled for colonoscopy on 3/13/21

## 2021-03-30 NOTE — HISTORY OF PRESENT ILLNESS
[de-identified] : This 71 year old man underwent a partial colectomy 14 years ago for a CA of the colon. He has undergone multiple normal colonoscopies since the surgery. The patient is asymptomatic regarding his GI tract.

## 2021-03-30 NOTE — REVIEW OF SYSTEMS
[Negative] : Genitourinary [Heart Rate Is Slow] : the heart rate was not slow [Chest Pain] : no chest pain [Shortness Of Breath] : no shortness of breath [Abdominal Pain] : no abdominal pain [Constipation] : no constipation

## 2021-04-12 ENCOUNTER — TRANSCRIPTION ENCOUNTER (OUTPATIENT)
Age: 71
End: 2021-04-12

## 2021-04-12 ENCOUNTER — OUTPATIENT (OUTPATIENT)
Dept: OUTPATIENT SERVICES | Facility: HOSPITAL | Age: 71
LOS: 1 days | End: 2021-04-12
Payer: MEDICARE

## 2021-04-12 DIAGNOSIS — Z98.89 OTHER SPECIFIED POSTPROCEDURAL STATES: Chronic | ICD-10-CM

## 2021-04-12 DIAGNOSIS — Z90.49 ACQUIRED ABSENCE OF OTHER SPECIFIED PARTS OF DIGESTIVE TRACT: Chronic | ICD-10-CM

## 2021-04-12 DIAGNOSIS — Z20.828 CONTACT WITH AND (SUSPECTED) EXPOSURE TO OTHER VIRAL COMMUNICABLE DISEASES: ICD-10-CM

## 2021-04-12 LAB — SARS-COV-2 RNA SPEC QL NAA+PROBE: SIGNIFICANT CHANGE UP

## 2021-04-12 PROCEDURE — U0005: CPT

## 2021-04-12 PROCEDURE — U0003: CPT

## 2021-04-13 ENCOUNTER — OUTPATIENT (OUTPATIENT)
Dept: OUTPATIENT SERVICES | Facility: HOSPITAL | Age: 71
LOS: 1 days | End: 2021-04-13
Payer: MEDICARE

## 2021-04-13 DIAGNOSIS — Z90.49 ACQUIRED ABSENCE OF OTHER SPECIFIED PARTS OF DIGESTIVE TRACT: Chronic | ICD-10-CM

## 2021-04-13 DIAGNOSIS — Z98.89 OTHER SPECIFIED POSTPROCEDURAL STATES: Chronic | ICD-10-CM

## 2021-04-13 DIAGNOSIS — Z85.038 PERSONAL HISTORY OF OTHER MALIGNANT NEOPLASM OF LARGE INTESTINE: ICD-10-CM

## 2021-04-13 DIAGNOSIS — Z12.11 ENCOUNTER FOR SCREENING FOR MALIGNANT NEOPLASM OF COLON: ICD-10-CM

## 2021-04-13 DIAGNOSIS — Z86.010 PERSONAL HISTORY OF COLONIC POLYPS: ICD-10-CM

## 2021-04-13 LAB — GLUCOSE BLDC GLUCOMTR-MCNC: 142 MG/DL — HIGH (ref 70–99)

## 2021-04-13 PROCEDURE — 82962 GLUCOSE BLOOD TEST: CPT

## 2021-04-13 PROCEDURE — G0105: CPT

## 2021-04-13 RX ORDER — SODIUM CHLORIDE 9 MG/ML
500 INJECTION INTRAMUSCULAR; INTRAVENOUS; SUBCUTANEOUS
Refills: 0 | Status: COMPLETED | OUTPATIENT
Start: 2021-04-13 | End: 2021-04-13

## 2021-04-13 RX ADMIN — SODIUM CHLORIDE 75 MILLILITER(S): 9 INJECTION INTRAMUSCULAR; INTRAVENOUS; SUBCUTANEOUS at 12:37

## 2021-06-04 ENCOUNTER — APPOINTMENT (OUTPATIENT)
Dept: FAMILY MEDICINE | Facility: CLINIC | Age: 71
End: 2021-06-04
Payer: MEDICARE

## 2021-06-04 VITALS
TEMPERATURE: 98.4 F | OXYGEN SATURATION: 96 % | DIASTOLIC BLOOD PRESSURE: 79 MMHG | SYSTOLIC BLOOD PRESSURE: 156 MMHG | RESPIRATION RATE: 14 BRPM | WEIGHT: 231 LBS | BODY MASS INDEX: 33.07 KG/M2 | HEART RATE: 64 BPM | HEIGHT: 70 IN

## 2021-06-04 PROCEDURE — 99214 OFFICE O/P EST MOD 30 MIN: CPT | Mod: 25

## 2021-06-04 PROCEDURE — 36415 COLL VENOUS BLD VENIPUNCTURE: CPT

## 2021-06-04 NOTE — HISTORY OF PRESENT ILLNESS
[FreeTextEntry1] : See HPI. [de-identified] : Is a 71-year-old gentleman who presents today for follow-up and disease management patient states that he is been in his usual state of health he presently denies any chest pain or shortness of breath.\par \par At today's visit we will be addressing hypertension, elevated BMI, diabetes mellitus type 2 non-insulin-requiring, hyperlipidemia, history of bladder cancer followed very closely by urology, obstructive sleep apnea, peripheral vascular disease and patient also has history of colon cancer post resection patient recently had colonoscopy no recurrence and it was recommended follow-up colonoscopy in 5 years.\par \par Patient is up-to-date with COVID-19 vaccination he has received both vaccines which were Moderna first vaccine was administered good on January 21, 2021 the second vaccine was administered on February 20, 2021.

## 2021-06-04 NOTE — PHYSICAL EXAM
[No Acute Distress] : no acute distress [Well Nourished] : well nourished [Well Developed] : well developed [Well-Appearing] : well-appearing [Normal Sclera/Conjunctiva] : normal sclera/conjunctiva [PERRL] : pupils equal round and reactive to light [EOMI] : extraocular movements intact [Normal Outer Ear/Nose] : the outer ears and nose were normal in appearance [Normal Oropharynx] : the oropharynx was normal [No JVD] : no jugular venous distention [No Lymphadenopathy] : no lymphadenopathy [Supple] : supple [Thyroid Normal, No Nodules] : the thyroid was normal and there were no nodules present [No Respiratory Distress] : no respiratory distress  [No Accessory Muscle Use] : no accessory muscle use [Clear to Auscultation] : lungs were clear to auscultation bilaterally [Normal Rate] : normal rate  [Regular Rhythm] : with a regular rhythm [Normal S1, S2] : normal S1 and S2 [No Murmur] : no murmur heard [No Carotid Bruits] : no carotid bruits [No Abdominal Bruit] : a ~M bruit was not heard ~T in the abdomen [No Varicosities] : no varicosities [Pedal Pulses Present] : the pedal pulses are present [No Palpable Aorta] : no palpable aorta [No Extremity Clubbing/Cyanosis] : no extremity clubbing/cyanosis [Soft] : abdomen soft [Non Tender] : non-tender [Non-distended] : non-distended [No Masses] : no abdominal mass palpated [No HSM] : no HSM [Normal Bowel Sounds] : normal bowel sounds [Normal Posterior Cervical Nodes] : no posterior cervical lymphadenopathy [Normal Anterior Cervical Nodes] : no anterior cervical lymphadenopathy [No CVA Tenderness] : no CVA  tenderness [No Spinal Tenderness] : no spinal tenderness [No Joint Swelling] : no joint swelling [Grossly Normal Strength/Tone] : grossly normal strength/tone [No Rash] : no rash [Coordination Grossly Intact] : coordination grossly intact [No Focal Deficits] : no focal deficits [Normal Gait] : normal gait [Deep Tendon Reflexes (DTR)] : deep tendon reflexes were 2+ and symmetric [Speech Grossly Normal] : speech grossly normal [Memory Grossly Normal] : memory grossly normal [Normal Affect] : the affect was normal [Alert and Oriented x3] : oriented to person, place, and time [Normal Mood] : the mood was normal [Normal Insight/Judgement] : insight and judgment were intact [Comprehensive Foot Exam Normal] : Right and left foot were examined and both feet are normal. No ulcers in either foot. Toes are normal and with full ROM.  Normal tactile sensation with monofilament testing throughout both feet [Normal Voice/Communication] : normal voice/communication [de-identified] : Bilateral lower extremity edema, left greater than right pitting, bilaterally. Patient does not tolerate support hose [de-identified] : edema no sores

## 2021-06-04 NOTE — ASSESSMENT
[FreeTextEntry1] : Assessment and plan:\par \par 1.  Hypertension when first taken blood pressure was slightly elevated approximately 30 minutes later blood pressure dropped to 140/78.  I discussed with patient weight loss program, lifestyle changes and sodium intake and I recommend that the patient continue clonidine 0.1 mg 1 tablet twice daily, hydralazine 50 mg twice a day metoprolol succinate 50 mg p.o. daily and valsartan hydrochlorothiazide 300–25 1 tablet daily.  Patient was on amlodipine in the past which worsened his lower extremity edema patient has peripheral vascular disease.\par \par 2.  Elevated BMI detailed discussion with patient regarding weight loss program increase physical activity, no concentrated sweets consistent carbohydrates, low-fat low-cholesterol and portion control.\par \par 3.  Diabetes mellitus type 2 non-insulin-requiring I recommend no concentrated sweets consistent carbohydrate diet and patient will continue Metformin which has been increased with 1000 mg twice a day if necessary I will add other medications to decrease his blood sugar.  Comprehensive blood work was drawn in office by examiner at today's visit and I will be checking hemoglobin A1c.\par \par 4.  Hyperlipidemia continue low-fat low-cholesterol diet and simvastatin 20 mg p.o. daily at bedtime.\par \par 5.  Peripheral vascular disease patient does have venous insufficiency edema did improve after stopping amlodipine but still present worsens as the day progresses and it is at its maximum at bedtime.  Unfortunately the patient has attempted support hose he is not capable of tolerating that he said he is more capable of tolerating the edema.\par \par 6.  History of colon cancer recent colonoscopy done by Dr. Moody shows that there is no recurrence and it is recommended that the patient have colonoscopy redone in 5 years therefore the patient's colon cancer is in remission.\par \par 7.  Bladder cancer patient doing well he denies any hematuria and is followed closely by urology.

## 2021-06-04 NOTE — HEALTH RISK ASSESSMENT

## 2021-06-08 LAB
ALBUMIN SERPL ELPH-MCNC: 4.4 G/DL
ALP BLD-CCNC: 56 U/L
ALT SERPL-CCNC: 18 U/L
ANION GAP SERPL CALC-SCNC: 13 MMOL/L
AST SERPL-CCNC: 13 U/L
BASOPHILS # BLD AUTO: 0.04 K/UL
BASOPHILS NFR BLD AUTO: 0.5 %
BILIRUB SERPL-MCNC: 0.4 MG/DL
BUN SERPL-MCNC: 19 MG/DL
CALCIUM SERPL-MCNC: 9.6 MG/DL
CEA SERPL-MCNC: 2.8 NG/ML
CHLORIDE SERPL-SCNC: 102 MMOL/L
CHOLEST SERPL-MCNC: 155 MG/DL
CO2 SERPL-SCNC: 27 MMOL/L
CREAT SERPL-MCNC: 0.93 MG/DL
EOSINOPHIL # BLD AUTO: 0.21 K/UL
EOSINOPHIL NFR BLD AUTO: 2.5 %
ESTIMATED AVERAGE GLUCOSE: 140 MG/DL
GLUCOSE SERPL-MCNC: 156 MG/DL
HBA1C MFR BLD HPLC: 6.5 %
HCT VFR BLD CALC: 43.4 %
HDLC SERPL-MCNC: 28 MG/DL
HGB BLD-MCNC: 14 G/DL
IMM GRANULOCYTES NFR BLD AUTO: 0.2 %
LDLC SERPL CALC-MCNC: 86 MG/DL
LYMPHOCYTES # BLD AUTO: 2.15 K/UL
LYMPHOCYTES NFR BLD AUTO: 25.7 %
MAN DIFF?: NORMAL
MCHC RBC-ENTMCNC: 28.9 PG
MCHC RBC-ENTMCNC: 32.3 GM/DL
MCV RBC AUTO: 89.5 FL
MONOCYTES # BLD AUTO: 0.57 K/UL
MONOCYTES NFR BLD AUTO: 6.8 %
NEUTROPHILS # BLD AUTO: 5.37 K/UL
NEUTROPHILS NFR BLD AUTO: 64.3 %
NONHDLC SERPL-MCNC: 127 MG/DL
PLATELET # BLD AUTO: 231 K/UL
POTASSIUM SERPL-SCNC: 3.6 MMOL/L
PROT SERPL-MCNC: 6.7 G/DL
RBC # BLD: 4.85 M/UL
RBC # FLD: 13.3 %
SODIUM SERPL-SCNC: 141 MMOL/L
TRIGL SERPL-MCNC: 207 MG/DL
WBC # FLD AUTO: 8.36 K/UL

## 2021-07-20 ENCOUNTER — RX RENEWAL (OUTPATIENT)
Age: 71
End: 2021-07-20

## 2021-08-17 ENCOUNTER — RX RENEWAL (OUTPATIENT)
Age: 71
End: 2021-08-17

## 2021-09-13 ENCOUNTER — APPOINTMENT (OUTPATIENT)
Dept: FAMILY MEDICINE | Facility: CLINIC | Age: 71
End: 2021-09-13
Payer: MEDICARE

## 2021-09-13 VITALS
RESPIRATION RATE: 14 BRPM | HEART RATE: 67 BPM | WEIGHT: 224 LBS | SYSTOLIC BLOOD PRESSURE: 155 MMHG | HEIGHT: 70 IN | TEMPERATURE: 97.2 F | BODY MASS INDEX: 32.07 KG/M2 | DIASTOLIC BLOOD PRESSURE: 71 MMHG | OXYGEN SATURATION: 96 %

## 2021-09-13 PROCEDURE — 36415 COLL VENOUS BLD VENIPUNCTURE: CPT

## 2021-09-13 PROCEDURE — 99214 OFFICE O/P EST MOD 30 MIN: CPT | Mod: 25

## 2021-09-13 RX ORDER — CLINDAMYCIN HYDROCHLORIDE 300 MG/1
300 CAPSULE ORAL
Qty: 30 | Refills: 0 | Status: COMPLETED | COMMUNITY
Start: 2021-07-26

## 2021-09-13 RX ORDER — IBUPROFEN 800 MG/1
800 TABLET, FILM COATED ORAL
Qty: 15 | Refills: 0 | Status: ACTIVE | COMMUNITY
Start: 2021-07-27

## 2021-09-13 NOTE — HEALTH RISK ASSESSMENT
[Yes] : Yes [2 - 3 times a week (3 pts)] : 2 - 3  times a week (3 points) [1 or 2 (0 pts)] : 1 or 2 (0 points) [Never (0 pts)] : Never (0 points) [No] : In the past 12 months have you used drugs other than those required for medical reasons? No [No falls in past year] : Patient reported no falls in the past year [0] : 2) Feeling down, depressed, or hopeless: Not at all (0) [PHQ-2 Negative - No further assessment needed] : PHQ-2 Negative - No further assessment needed [Audit-CScore] : 3 [[id number]] : 0

## 2021-09-13 NOTE — ASSESSMENT
[FreeTextEntry1] : Assessment and plan:\par \par 1.  Hypertension when first taken blood pressure was slightly elevated approximately 30 minutes later blood pressure dropped to 132/70.  I discussed with patient weight loss program, lifestyle changes and sodium intake and I recommend that the patient continue clonidine 0.1 mg 1 tablet twice daily, hydralazine 50 mg twice a day metoprolol succinate 50 mg p.o. daily and valsartan hydrochlorothiazide 300–25 1 tablet daily.  Patient was on amlodipine in the past which worsened his lower extremity edema patient has peripheral vascular disease.\par \par 2.  Elevated BMI detailed discussion with patient regarding weight loss program increase physical activity, no concentrated sweets consistent carbohydrates, low-fat low-cholesterol and portion control.\par \par 3.  Diabetes mellitus type 2 non-insulin-requiring I recommend no concentrated sweets consistent carbohydrate diet and patient will continue Metformin which has been increased with 1000 mg twice a day if necessary I will add other medications to decrease his blood sugar.  Comprehensive blood work was drawn in office by examiner at today's visit and I will be checking hemoglobin A1c.\par \par 4.  Hyperlipidemia continue low-fat low-cholesterol diet and simvastatin 20 mg p.o. daily at bedtime.\par \par 5.  Peripheral vascular disease patient does have venous insufficiency edema did improve after stopping amlodipine but still present worsens as the day progresses and it is at its maximum at bedtime.  Unfortunately the patient has attempted support hose he is not capable of tolerating that he said he is more capable of tolerating the edema.\par \par 6.  History of colon cancer recent colonoscopy done by Dr. Moody shows that there is no recurrence and it is recommended that the patient have colonoscopy redone in 3 years therefore the patient's colon cancer is in remission.\par \par 7.  Bladder cancer patient doing well he denies any hematuria and is followed closely by urology.\par \par 8.  Patient is up-to-date with COVID-19 vaccination patient received both doses of Moderna.

## 2021-09-13 NOTE — COUNSELING
[Fall prevention counseling provided] : Fall prevention counseling provided [Adequate lighting] : Adequate lighting [No throw rugs] : No throw rugs [Use proper foot wear] : Use proper foot wear [Behavioral health counseling provided] : Behavioral health counseling provided [Sleep ___ hours/day] : Sleep [unfilled] hours/day [Engage in a relaxing activity] : Engage in a relaxing activity [Plan in advance] : Plan in advance [AUDIT-C Screening administered and reviewed] : AUDIT-C Screening administered and reviewed [Potential consequences of obesity discussed] : Potential consequences of obesity discussed [Benefits of weight loss discussed] : Benefits of weight loss discussed [Structured Weight Management Program suggested:] : Structured weight management program suggested [Encouraged to maintain food diary] : Encouraged to maintain food diary [Encouraged to increase physical activity] : Encouraged to increase physical activity [Encouraged to use exercise tracking device] : Encouraged to use exercise tracking device [Weigh Self Weekly] : weigh self weekly [Decrease Portions] : decrease portions [____ min/wk Activity] : [unfilled] min/wk activity [Keep Food Diary] : keep food diary [Good understanding] : Patient has a good understanding of disease, goals and obesity follow-up plan [FreeTextEntry1] : NATALIE [General Appearance - Well Developed] : interactive [General Appearance - Well-Appearing] : well appearing [General Appearance - In No Acute Distress] : in no acute distress [Appearance Of Head] : the head was normocephalic [Sclera] : the sclera and conjunctiva were normal [PERRL With Normal Accommodation] : pupils were equal in size, round, reactive to light, with normal accommodation [Extraocular Movements] : extraocular movements were intact [Outer Ear] : the ears and nose were normal in appearance [Both Tympanic Membranes Were Examined] : both tympanic membranes were normal [Nasal Cavity] : the nasal mucosa and septum were normal [Examination Of The Oral Cavity] : the teeth, gums, and palate were normal [Oropharynx] : the oropharynx was normal  [Neck Cervical Mass (___cm)] : no neck mass was observed [Respiration, Rhythm And Depth] : normal respiratory rhythm and effort [Auscultation Breath Sounds / Voice Sounds] : clear bilateral breath sounds [Heart Rate And Rhythm] : heart rate and rhythm were normal [Heart Sounds] : normal S1 and S2 [Murmurs] : no murmurs [Bowel Sounds] : normal bowel sounds [Abdomen Soft] : soft [Abdomen Tenderness] : non-tender [Abdominal Distention] : nondistended [Musculoskeletal Exam: Normal Movement Of All Extremities] : normal movements of all extremities [Motor Tone] : muscle strength and tone were normal [No Visual Abnormalities] : no visible abnormailities [Deep Tendon Reflexes (DTR)] : deep tendon reflexes were 2+ and symmetric [Generalized Lymph Node Enlargement] : no lymphadenopathy [Skin Color & Pigmentation] : normal skin color and pigmentation [] : no significant rash [Skin Lesions] : no skin lesions [Initial Inspection: Infant Active And Alert] : active and alert [Penis Abnormality] : the penis was normal [Scrotum] : the scrotum was normal [Testes Mass (___cm)] : there were no testicular masses [Naun Stage _____] : the Naun stage for pubic hair development was [unfilled]

## 2021-09-13 NOTE — PHYSICAL EXAM
[No Acute Distress] : no acute distress [Well Nourished] : well nourished [Well Developed] : well developed [Well-Appearing] : well-appearing [Normal Voice/Communication] : normal voice/communication [Normal Sclera/Conjunctiva] : normal sclera/conjunctiva [PERRL] : pupils equal round and reactive to light [EOMI] : extraocular movements intact [Normal Outer Ear/Nose] : the outer ears and nose were normal in appearance [Normal Oropharynx] : the oropharynx was normal [No JVD] : no jugular venous distention [No Lymphadenopathy] : no lymphadenopathy [Supple] : supple [Thyroid Normal, No Nodules] : the thyroid was normal and there were no nodules present [No Respiratory Distress] : no respiratory distress  [No Accessory Muscle Use] : no accessory muscle use [Clear to Auscultation] : lungs were clear to auscultation bilaterally [Normal Rate] : normal rate  [Regular Rhythm] : with a regular rhythm [Normal S1, S2] : normal S1 and S2 [No Murmur] : no murmur heard [No Carotid Bruits] : no carotid bruits [No Abdominal Bruit] : a ~M bruit was not heard ~T in the abdomen [No Varicosities] : no varicosities [Pedal Pulses Present] : the pedal pulses are present [No Palpable Aorta] : no palpable aorta [No Extremity Clubbing/Cyanosis] : no extremity clubbing/cyanosis [Soft] : abdomen soft [Non-distended] : non-distended [Non Tender] : non-tender [No Masses] : no abdominal mass palpated [No HSM] : no HSM [Normal Bowel Sounds] : normal bowel sounds [Normal Posterior Cervical Nodes] : no posterior cervical lymphadenopathy [Normal Anterior Cervical Nodes] : no anterior cervical lymphadenopathy [No CVA Tenderness] : no CVA  tenderness [No Spinal Tenderness] : no spinal tenderness [No Joint Swelling] : no joint swelling [Grossly Normal Strength/Tone] : grossly normal strength/tone [No Rash] : no rash [Coordination Grossly Intact] : coordination grossly intact [No Focal Deficits] : no focal deficits [Normal Gait] : normal gait [Deep Tendon Reflexes (DTR)] : deep tendon reflexes were 2+ and symmetric [Speech Grossly Normal] : speech grossly normal [Memory Grossly Normal] : memory grossly normal [Normal Affect] : the affect was normal [Alert and Oriented x3] : oriented to person, place, and time [Normal Mood] : the mood was normal [Normal Insight/Judgement] : insight and judgment were intact [Comprehensive Foot Exam Normal] : Right and left foot were examined and both feet are normal. No ulcers in either foot. Toes are normal and with full ROM.  Normal tactile sensation with monofilament testing throughout both feet [de-identified] : Bilateral lower extremity edema, left greater than right pitting, bilaterally. Patient does not tolerate support hose [de-identified] : edema no sores

## 2021-09-13 NOTE — HISTORY OF PRESENT ILLNESS
[FreeTextEntry1] : See HPI. [de-identified] : Patient is a 71-year-old gentleman who presents today for follow-up and disease management patient states that he is been in his usual state of health he denies any chest pain or shortness of breath at rest.  We will be addressing hypertension, elevated BMI, diabetes mellitus type 2 non-insulin-requiring, hyperlipidemia, peripheral vascular disease, history of colon cancer status post resection recently had colonoscopy done by Dr. Papo Moody April 13, 2021 patient was stable follow-up colonoscopy in 3 years.  Patient also has history of bladder CA followed closely by urology no new symptoms.\par \par Presently the patient is awake alert and oriented x3 in no acute distress calm and cooperative I will be obtaining comprehensive blood work at today's visit.

## 2021-09-14 LAB
ALBUMIN SERPL ELPH-MCNC: 4.4 G/DL
ALP BLD-CCNC: 57 U/L
ALT SERPL-CCNC: 23 U/L
ANION GAP SERPL CALC-SCNC: 14 MMOL/L
AST SERPL-CCNC: 16 U/L
BILIRUB SERPL-MCNC: 0.5 MG/DL
BUN SERPL-MCNC: 15 MG/DL
CALCIUM SERPL-MCNC: 9.7 MG/DL
CEA SERPL-MCNC: 2.8 NG/ML
CHLORIDE SERPL-SCNC: 103 MMOL/L
CHOLEST SERPL-MCNC: 130 MG/DL
CO2 SERPL-SCNC: 27 MMOL/L
CREAT SERPL-MCNC: 0.96 MG/DL
ESTIMATED AVERAGE GLUCOSE: 134 MG/DL
GLUCOSE SERPL-MCNC: 141 MG/DL
HBA1C MFR BLD HPLC: 6.3 %
HDLC SERPL-MCNC: 31 MG/DL
LDLC SERPL CALC-MCNC: 67 MG/DL
NONHDLC SERPL-MCNC: 99 MG/DL
POTASSIUM SERPL-SCNC: 3.5 MMOL/L
PROT SERPL-MCNC: 7.1 G/DL
SODIUM SERPL-SCNC: 144 MMOL/L
TRIGL SERPL-MCNC: 161 MG/DL

## 2021-12-23 ENCOUNTER — APPOINTMENT (OUTPATIENT)
Dept: FAMILY MEDICINE | Facility: CLINIC | Age: 71
End: 2021-12-23
Payer: MEDICARE

## 2021-12-23 VITALS
HEART RATE: 72 BPM | BODY MASS INDEX: 32.35 KG/M2 | SYSTOLIC BLOOD PRESSURE: 146 MMHG | RESPIRATION RATE: 14 BRPM | WEIGHT: 226 LBS | TEMPERATURE: 97.1 F | OXYGEN SATURATION: 98 % | HEIGHT: 70 IN | DIASTOLIC BLOOD PRESSURE: 74 MMHG

## 2021-12-23 PROCEDURE — 36415 COLL VENOUS BLD VENIPUNCTURE: CPT

## 2021-12-23 PROCEDURE — 99214 OFFICE O/P EST MOD 30 MIN: CPT | Mod: 25

## 2021-12-23 NOTE — HEALTH RISK ASSESSMENT
[Former] : Former [20 or more] : 20 or more [Yes] : Yes [2 - 4 times a month (2 pts)] : 2-4 times a month (2 points) [1 or 2 (0 pts)] : 1 or 2 (0 points) [Never (0 pts)] : Never (0 points) [No] : In the past 12 months have you used drugs other than those required for medical reasons? No [No falls in past year] : Patient reported no falls in the past year [0] : 2) Feeling down, depressed, or hopeless: Not at all (0) [PHQ-2 Negative - No further assessment needed] : PHQ-2 Negative - No further assessment needed [YearQuit] : 1985 [Audit-CScore] : 2 [[id number]] : 0

## 2021-12-23 NOTE — COUNSELING
[Fall prevention counseling provided] : Fall prevention counseling provided [Adequate lighting] : Adequate lighting [No throw rugs] : No throw rugs [Use proper foot wear] : Use proper foot wear [Behavioral health counseling provided] : Behavioral health counseling provided [Sleep ___ hours/day] : Sleep [unfilled] hours/day [Engage in a relaxing activity] : Engage in a relaxing activity [Plan in advance] : Plan in advance [AUDIT-C Screening administered and reviewed] : AUDIT-C Screening administered and reviewed [Potential consequences of obesity discussed] : Potential consequences of obesity discussed [Benefits of weight loss discussed] : Benefits of weight loss discussed [Structured Weight Management Program suggested:] : Structured weight management program suggested [Encouraged to maintain food diary] : Encouraged to maintain food diary [Encouraged to increase physical activity] : Encouraged to increase physical activity [Encouraged to use exercise tracking device] : Encouraged to use exercise tracking device [Target Wt Loss Goal ___] : Weight Loss Goals: Target weight loss goal [unfilled] lbs [Weigh Self Weekly] : weigh self weekly [Decrease Portions] : decrease portions [____ min/wk Activity] : [unfilled] min/wk activity [Keep Food Diary] : keep food diary [None] : None [Good understanding] : Patient has a good understanding of lifestyle changes and steps needed to achieve self management goal [FreeTextEntry1] : NATALIE

## 2021-12-23 NOTE — PHYSICAL EXAM
[No Acute Distress] : no acute distress [Well Nourished] : well nourished [Well Developed] : well developed [Well-Appearing] : well-appearing [Normal Voice/Communication] : normal voice/communication [Normal Sclera/Conjunctiva] : normal sclera/conjunctiva [PERRL] : pupils equal round and reactive to light [EOMI] : extraocular movements intact [Normal Outer Ear/Nose] : the outer ears and nose were normal in appearance [Normal Oropharynx] : the oropharynx was normal [No JVD] : no jugular venous distention [No Lymphadenopathy] : no lymphadenopathy [Supple] : supple [Thyroid Normal, No Nodules] : the thyroid was normal and there were no nodules present [No Respiratory Distress] : no respiratory distress  [No Accessory Muscle Use] : no accessory muscle use [Clear to Auscultation] : lungs were clear to auscultation bilaterally [Normal Rate] : normal rate  [Regular Rhythm] : with a regular rhythm [Normal S1, S2] : normal S1 and S2 [No Murmur] : no murmur heard [No Carotid Bruits] : no carotid bruits [No Abdominal Bruit] : a ~M bruit was not heard ~T in the abdomen [No Varicosities] : no varicosities [Pedal Pulses Present] : the pedal pulses are present [No Palpable Aorta] : no palpable aorta [No Extremity Clubbing/Cyanosis] : no extremity clubbing/cyanosis [Soft] : abdomen soft [Non Tender] : non-tender [Non-distended] : non-distended [No Masses] : no abdominal mass palpated [No HSM] : no HSM [Normal Bowel Sounds] : normal bowel sounds [Normal Posterior Cervical Nodes] : no posterior cervical lymphadenopathy [Normal Anterior Cervical Nodes] : no anterior cervical lymphadenopathy [No CVA Tenderness] : no CVA  tenderness [No Spinal Tenderness] : no spinal tenderness [No Joint Swelling] : no joint swelling [Grossly Normal Strength/Tone] : grossly normal strength/tone [No Rash] : no rash [Coordination Grossly Intact] : coordination grossly intact [No Focal Deficits] : no focal deficits [Normal Gait] : normal gait [Deep Tendon Reflexes (DTR)] : deep tendon reflexes were 2+ and symmetric [Speech Grossly Normal] : speech grossly normal [Memory Grossly Normal] : memory grossly normal [Normal Affect] : the affect was normal [Alert and Oriented x3] : oriented to person, place, and time [Normal Mood] : the mood was normal [Normal Insight/Judgement] : insight and judgment were intact [Comprehensive Foot Exam Normal] : Right and left foot were examined and both feet are normal. No ulcers in either foot. Toes are normal and with full ROM.  Normal tactile sensation with monofilament testing throughout both feet [de-identified] : Bilateral lower extremity edema, improved [de-identified] : edema no sores

## 2021-12-23 NOTE — HISTORY OF PRESENT ILLNESS
[FreeTextEntry1] : Please see HPI [de-identified] : Patient is 71-year-old gentleman who presents today for follow-up and disease management patient states that he is in his usual state of health presently denies any chest pain but does admit to dyspnea with exertion we will be addressing hypertension, elevated BMI, hyperlipidemia, PVD complication of diabetes mellitus, diabetes mellitus type 2, history of colon CA and bladder CA

## 2021-12-23 NOTE — ASSESSMENT
[FreeTextEntry1] : Assessment and plan:\par \par 1.  Hypertension when first taken blood pressure was slightly elevated approximately 30 minutes later blood pressure dropped to 134/74.  I discussed with patient weight loss program, lifestyle changes and sodium intake and I recommend that the patient continue clonidine 0.1 mg 1 tablet twice daily, hydralazine 50 mg twice a day metoprolol succinate 50 mg p.o. daily and valsartan hydrochlorothiazide 300–25 1 tablet daily.  Patient was on amlodipine in the past which worsened his lower extremity edema patient has peripheral vascular disease.\par \par 2.  Elevated BMI detailed discussion with patient regarding weight loss program increase physical activity, no concentrated sweets consistent carbohydrates, low-fat low-cholesterol and portion control.\par \par 3.  Diabetes mellitus type 2 non-insulin-requiring I recommend no concentrated sweets consistent carbohydrate diet and patient will continue Metformin which has been increased with 1000 mg twice a day if necessary I will add other medications to decrease his blood sugar.  Comprehensive blood work was drawn in office by examiner at today's visit and I will be checking hemoglobin A1c.  Patient's hemoglobin A1c at last visit was 6.3 which is excellent control.\par \par 4.  Hyperlipidemia continue low-fat low-cholesterol diet and simvastatin 20 mg p.o. daily at bedtime.\par \par 5.  Peripheral vascular disease patient does have venous insufficiency edema did improve after stopping amlodipine but still present worsens as the day progresses and it is at its maximum at bedtime.  Unfortunately the patient has attempted support hose he is not capable of tolerating that he said he is more capable of tolerating the edema.\par \par 6.  History of colon cancer recent colonoscopy done by Dr. Moody shows that there is no recurrence and it is recommended that the patient have colonoscopy redone in 3 years therefore the patient's colon cancer is in remission.\par \par 7.  Bladder cancer patient doing well he denies any hematuria and is followed closely by urology.\par \par 8.  Patient is up-to-date with COVID-19 vaccination patient received both doses of Moderna.  And ultimately booster declines influenza vaccine.\par \par All medications refilled.

## 2021-12-24 LAB
ALBUMIN SERPL ELPH-MCNC: 4.5 G/DL
ALP BLD-CCNC: 60 U/L
ALT SERPL-CCNC: 23 U/L
ANION GAP SERPL CALC-SCNC: 14 MMOL/L
AST SERPL-CCNC: 14 U/L
BASOPHILS # BLD AUTO: 0.05 K/UL
BASOPHILS NFR BLD AUTO: 0.6 %
BILIRUB SERPL-MCNC: 0.4 MG/DL
BUN SERPL-MCNC: 17 MG/DL
CALCIUM SERPL-MCNC: 9.8 MG/DL
CEA SERPL-MCNC: 2.2 NG/ML
CHLORIDE SERPL-SCNC: 99 MMOL/L
CHOLEST SERPL-MCNC: 144 MG/DL
CO2 SERPL-SCNC: 27 MMOL/L
CREAT SERPL-MCNC: 0.97 MG/DL
EOSINOPHIL # BLD AUTO: 0.18 K/UL
EOSINOPHIL NFR BLD AUTO: 2.1 %
ESTIMATED AVERAGE GLUCOSE: 143 MG/DL
GLUCOSE SERPL-MCNC: 152 MG/DL
HBA1C MFR BLD HPLC: 6.6 %
HCT VFR BLD CALC: 44.9 %
HDLC SERPL-MCNC: 32 MG/DL
HGB BLD-MCNC: 14.6 G/DL
IMM GRANULOCYTES NFR BLD AUTO: 0.2 %
LDLC SERPL CALC-MCNC: 72 MG/DL
LYMPHOCYTES # BLD AUTO: 2.24 K/UL
LYMPHOCYTES NFR BLD AUTO: 25.9 %
MAN DIFF?: NORMAL
MCHC RBC-ENTMCNC: 29 PG
MCHC RBC-ENTMCNC: 32.5 GM/DL
MCV RBC AUTO: 89.3 FL
MONOCYTES # BLD AUTO: 0.69 K/UL
MONOCYTES NFR BLD AUTO: 8 %
NEUTROPHILS # BLD AUTO: 5.48 K/UL
NEUTROPHILS NFR BLD AUTO: 63.2 %
NONHDLC SERPL-MCNC: 112 MG/DL
PLATELET # BLD AUTO: 245 K/UL
POTASSIUM SERPL-SCNC: 3.7 MMOL/L
PROT SERPL-MCNC: 7 G/DL
RBC # BLD: 5.03 M/UL
RBC # FLD: 13.2 %
SODIUM SERPL-SCNC: 140 MMOL/L
TRIGL SERPL-MCNC: 202 MG/DL
WBC # FLD AUTO: 8.66 K/UL

## 2022-05-06 ENCOUNTER — APPOINTMENT (OUTPATIENT)
Dept: FAMILY MEDICINE | Facility: CLINIC | Age: 72
End: 2022-05-06
Payer: MEDICARE

## 2022-05-06 VITALS
RESPIRATION RATE: 16 BRPM | WEIGHT: 234 LBS | SYSTOLIC BLOOD PRESSURE: 138 MMHG | HEART RATE: 64 BPM | OXYGEN SATURATION: 97 % | HEIGHT: 70 IN | TEMPERATURE: 98 F | BODY MASS INDEX: 33.5 KG/M2 | DIASTOLIC BLOOD PRESSURE: 80 MMHG

## 2022-05-06 PROCEDURE — 36415 COLL VENOUS BLD VENIPUNCTURE: CPT

## 2022-05-06 PROCEDURE — 99215 OFFICE O/P EST HI 40 MIN: CPT | Mod: 25

## 2022-05-06 NOTE — COUNSELING
[Fall prevention counseling provided] : Fall prevention counseling provided [Adequate lighting] : Adequate lighting [No throw rugs] : No throw rugs [Use proper foot wear] : Use proper foot wear [Behavioral health counseling provided] : Behavioral health counseling provided [Sleep ___ hours/day] : Sleep [unfilled] hours/day [Engage in a relaxing activity] : Engage in a relaxing activity [Plan in advance] : Plan in advance [AUDIT-C Screening administered and reviewed] : AUDIT-C Screening administered and reviewed [Potential consequences of obesity discussed] : Potential consequences of obesity discussed [Benefits of weight loss discussed] : Benefits of weight loss discussed [Structured Weight Management Program suggested:] : Structured weight management program suggested [Encouraged to maintain food diary] : Encouraged to maintain food diary [Encouraged to increase physical activity] : Encouraged to increase physical activity [Encouraged to use exercise tracking device] : Encouraged to use exercise tracking device [Target Wt Loss Goal ___] : Weight Loss Goals: Target weight loss goal [unfilled] lbs [Weigh Self Weekly] : weigh self weekly [Decrease Portions] : decrease portions [____ min/wk Activity] : [unfilled] min/wk activity [Keep Food Diary] : keep food diary [FreeTextEntry1] : NATALIE [Patient motivation] : Patient motivation [Good understanding] : Patient has a good understanding of lifestyle changes and steps needed to achieve self management goal

## 2022-05-06 NOTE — HISTORY OF PRESENT ILLNESS
[FreeTextEntry1] : Please see HPI [de-identified] : Patient is 72-year-old gentleman who presents today for follow-up and disease management patient states that he is in his usual state of health presently denies any chest pain but does admit to dyspnea with exertion we will be addressing hypertension, elevated BMI, hyperlipidemia, PVD complication of diabetes mellitus, diabetes mellitus type 2, history of colon CA and bladder CA.\par \par At today's visit I will be obtaining comprehensive blood work.

## 2022-05-06 NOTE — ASSESSMENT
[FreeTextEntry1] : Assessment and plan:\par \par 1.  Hypertension when first taken blood pressure was slightly elevated approximately 30 minutes later blood pressure dropped to 138/78.  I discussed with patient weight loss program, lifestyle changes and sodium intake and I recommend that the patient continue clonidine 0.1 mg 1 tablet twice daily, hydralazine 50 mg twice a day metoprolol succinate 50 mg p.o. daily and valsartan hydrochlorothiazide 300–25 1 tablet daily.  Patient was on amlodipine in the past which worsened his lower extremity edema patient has peripheral vascular disease.\par \par 2.  Elevated BMI detailed discussion with patient regarding weight loss program increase physical activity, no concentrated sweets consistent carbohydrates, low-fat low-cholesterol and portion control.\par \par 3.  Diabetes mellitus type 2 non-insulin-requiring I recommend no concentrated sweets consistent carbohydrate diet and patient will continue Metformin which has been increased with 1000 mg twice a day if necessary I will add other medications to decrease his blood sugar.  Comprehensive blood work was drawn in office by examiner at today's visit and I will be checking hemoglobin A1c.  Patient's hemoglobin A1c at last visit was 6.5 which is excellent control.\par \par 4.  Hyperlipidemia continue low-fat low-cholesterol diet and simvastatin 20 mg p.o. daily at bedtime.\par \par 5.  Peripheral vascular disease patient does have venous insufficiency edema did improve after stopping amlodipine but still present worsens as the day progresses and it is at its maximum at bedtime.  Unfortunately the patient has attempted support hose he is not capable of tolerating that he said he is more capable of tolerating the edema.  Therefore continue conservative management patient is doing well.\par \par 6.  History of colon cancer recent colonoscopy done by Dr. Moody shows that there is no recurrence and it is recommended that the patient have colonoscopy redone in 3 years therefore the patient's colon cancer is in remission.  Patient denies any blood in his stool and at today's visit I will be obtaining comprehensive blood work including carcinoembryonic antigen.\par \par 7.  Bladder cancer patient doing well he denies any hematuria and is followed closely by urology.  We will obtain urine analysis.\par \par 8.  Patient is up-to-date with COVID-19 vaccination patient received both doses of Moderna including first booster patient is candidate for second booster.\par \par 9.  Comprehensive blood work drawn in office by examiner, health maintenance issues discussed with patient, all medications reviewed counseling and coordination of care regarding diabetes mellitus with complications such as peripheral vascular disease presently stable and doing well total time spent face-to-face and non-face-to-face time 50 minutes.

## 2022-05-06 NOTE — PHYSICAL EXAM
[No Acute Distress] : no acute distress [Well Nourished] : well nourished [Well Developed] : well developed [Well-Appearing] : well-appearing [Normal Voice/Communication] : normal voice/communication [Normal Sclera/Conjunctiva] : normal sclera/conjunctiva [PERRL] : pupils equal round and reactive to light [EOMI] : extraocular movements intact [Normal Outer Ear/Nose] : the outer ears and nose were normal in appearance [Normal Oropharynx] : the oropharynx was normal [No JVD] : no jugular venous distention [No Lymphadenopathy] : no lymphadenopathy [Supple] : supple [Thyroid Normal, No Nodules] : the thyroid was normal and there were no nodules present [No Respiratory Distress] : no respiratory distress  [No Accessory Muscle Use] : no accessory muscle use [Clear to Auscultation] : lungs were clear to auscultation bilaterally [Normal Rate] : normal rate  [Regular Rhythm] : with a regular rhythm [Normal S1, S2] : normal S1 and S2 [No Murmur] : no murmur heard [No Carotid Bruits] : no carotid bruits [No Abdominal Bruit] : a ~M bruit was not heard ~T in the abdomen [No Varicosities] : no varicosities [Pedal Pulses Present] : the pedal pulses are present [No Palpable Aorta] : no palpable aorta [No Extremity Clubbing/Cyanosis] : no extremity clubbing/cyanosis [Soft] : abdomen soft [Non Tender] : non-tender [Non-distended] : non-distended [No Masses] : no abdominal mass palpated [No HSM] : no HSM [Normal Bowel Sounds] : normal bowel sounds [Normal Posterior Cervical Nodes] : no posterior cervical lymphadenopathy [Normal Anterior Cervical Nodes] : no anterior cervical lymphadenopathy [No CVA Tenderness] : no CVA  tenderness [No Spinal Tenderness] : no spinal tenderness [No Joint Swelling] : no joint swelling [Grossly Normal Strength/Tone] : grossly normal strength/tone [No Rash] : no rash [Coordination Grossly Intact] : coordination grossly intact [No Focal Deficits] : no focal deficits [Normal Gait] : normal gait [Deep Tendon Reflexes (DTR)] : deep tendon reflexes were 2+ and symmetric [Speech Grossly Normal] : speech grossly normal [Memory Grossly Normal] : memory grossly normal [Normal Affect] : the affect was normal [Alert and Oriented x3] : oriented to person, place, and time [Normal Mood] : the mood was normal [Normal Insight/Judgement] : insight and judgment were intact [Comprehensive Foot Exam Normal] : Right and left foot were examined and both feet are normal. No ulcers in either foot. Toes are normal and with full ROM.  Normal tactile sensation with monofilament testing throughout both feet [de-identified] : Bilateral lower extremity edema, improved [de-identified] : edema no sores

## 2022-05-06 NOTE — HEALTH RISK ASSESSMENT
[Former] : Former [20 or more] : 20 or more [Yes] : Yes [2 - 4 times a month (2 pts)] : 2-4 times a month (2 points) [1 or 2 (0 pts)] : 1 or 2 (0 points) [Never (0 pts)] : Never (0 points) [No] : In the past 12 months have you used drugs other than those required for medical reasons? No [No falls in past year] : Patient reported no falls in the past year [0] : 2) Feeling down, depressed, or hopeless: Not at all (0) [PHQ-2 Negative - No further assessment needed] : PHQ-2 Negative - No further assessment needed [With Patient/Caregiver] : , with patient/caregiver [Reviewed no changes] : Reviewed, no changes [Designated Healthcare Proxy] : Designated healthcare proxy [Relationship: ___] : Relationship: [unfilled] [Aggressive treatment] : aggressive treatment [I will adhere to the patient's wishes.] : I will adhere to the patient's wishes. [YearQuit] : 1998 [Audit-CScore] : 2 [IOY2Lphvp] : 0 [AdvancecareDate] : 05/22

## 2022-05-08 LAB
ALBUMIN SERPL ELPH-MCNC: 4.6 G/DL
ALP BLD-CCNC: 63 U/L
ALT SERPL-CCNC: 28 U/L
ANION GAP SERPL CALC-SCNC: 15 MMOL/L
APPEARANCE: CLEAR
AST SERPL-CCNC: 18 U/L
BACTERIA: NEGATIVE
BASOPHILS # BLD AUTO: 0.04 K/UL
BASOPHILS NFR BLD AUTO: 0.4 %
BILIRUB SERPL-MCNC: 0.4 MG/DL
BILIRUBIN URINE: NEGATIVE
BLOOD URINE: NEGATIVE
BUN SERPL-MCNC: 19 MG/DL
CALCIUM SERPL-MCNC: 9.6 MG/DL
CEA SERPL-MCNC: 2.2 NG/ML
CHLORIDE SERPL-SCNC: 100 MMOL/L
CHOLEST SERPL-MCNC: 141 MG/DL
CO2 SERPL-SCNC: 27 MMOL/L
COLOR: NORMAL
CREAT SERPL-MCNC: 0.86 MG/DL
CREAT SPEC-SCNC: 75 MG/DL
EGFR: 91 ML/MIN/1.73M2
EOSINOPHIL # BLD AUTO: 0.18 K/UL
EOSINOPHIL NFR BLD AUTO: 2 %
ESTIMATED AVERAGE GLUCOSE: 137 MG/DL
GLUCOSE QUALITATIVE U: NEGATIVE
GLUCOSE SERPL-MCNC: 140 MG/DL
HBA1C MFR BLD HPLC: 6.4 %
HCT VFR BLD CALC: 45.2 %
HDLC SERPL-MCNC: 33 MG/DL
HGB BLD-MCNC: 15 G/DL
HYALINE CASTS: 0 /LPF
IMM GRANULOCYTES NFR BLD AUTO: 0.4 %
KETONES URINE: NEGATIVE
LDLC SERPL CALC-MCNC: 75 MG/DL
LEUKOCYTE ESTERASE URINE: NEGATIVE
LYMPHOCYTES # BLD AUTO: 2.07 K/UL
LYMPHOCYTES NFR BLD AUTO: 23.2 %
MAN DIFF?: NORMAL
MCHC RBC-ENTMCNC: 29.3 PG
MCHC RBC-ENTMCNC: 33.2 GM/DL
MCV RBC AUTO: 88.3 FL
MICROALBUMIN 24H UR DL<=1MG/L-MCNC: 1.6 MG/DL
MICROALBUMIN/CREAT 24H UR-RTO: 21 MG/G
MICROSCOPIC-UA: NORMAL
MONOCYTES # BLD AUTO: 0.56 K/UL
MONOCYTES NFR BLD AUTO: 6.3 %
NEUTROPHILS # BLD AUTO: 6.05 K/UL
NEUTROPHILS NFR BLD AUTO: 67.7 %
NITRITE URINE: NEGATIVE
NONHDLC SERPL-MCNC: 108 MG/DL
PH URINE: 7
PLATELET # BLD AUTO: 236 K/UL
POTASSIUM SERPL-SCNC: 3.6 MMOL/L
PROT SERPL-MCNC: 7.4 G/DL
PROTEIN URINE: NEGATIVE
PSA SERPL-MCNC: 3.09 NG/ML
RBC # BLD: 5.12 M/UL
RBC # FLD: 13.2 %
RED BLOOD CELLS URINE: 3 /HPF
SODIUM SERPL-SCNC: 142 MMOL/L
SPECIFIC GRAVITY URINE: 1.01
SQUAMOUS EPITHELIAL CELLS: 0 /HPF
TRIGL SERPL-MCNC: 161 MG/DL
UROBILINOGEN URINE: NORMAL
WBC # FLD AUTO: 8.94 K/UL
WHITE BLOOD CELLS URINE: 1 /HPF

## 2022-08-23 ENCOUNTER — APPOINTMENT (OUTPATIENT)
Dept: FAMILY MEDICINE | Facility: CLINIC | Age: 72
End: 2022-08-23

## 2022-08-23 VITALS
OXYGEN SATURATION: 94 % | SYSTOLIC BLOOD PRESSURE: 168 MMHG | WEIGHT: 238 LBS | DIASTOLIC BLOOD PRESSURE: 80 MMHG | RESPIRATION RATE: 16 BRPM | TEMPERATURE: 98 F | HEIGHT: 70 IN | HEART RATE: 74 BPM | BODY MASS INDEX: 34.07 KG/M2

## 2022-08-23 VITALS — DIASTOLIC BLOOD PRESSURE: 80 MMHG | SYSTOLIC BLOOD PRESSURE: 148 MMHG

## 2022-08-23 PROCEDURE — 36415 COLL VENOUS BLD VENIPUNCTURE: CPT

## 2022-08-23 PROCEDURE — 99215 OFFICE O/P EST HI 40 MIN: CPT | Mod: 25

## 2022-08-23 RX ORDER — PEG-3350, SODIUM SULFATE, SODIUM CHLORIDE, POTASSIUM CHLORIDE, SODIUM ASCORBATE AND ASCORBIC ACID 7.5-2.691G
100 KIT ORAL
Qty: 1 | Refills: 0 | Status: COMPLETED | COMMUNITY
Start: 2021-03-29 | End: 2022-08-23

## 2022-08-23 RX ORDER — SODIUM SULFATE, POTASSIUM SULFATE, MAGNESIUM SULFATE 17.5; 3.13; 1.6 G/ML; G/ML; G/ML
17.5-3.13-1.6 SOLUTION, CONCENTRATE ORAL
Qty: 1 | Refills: 0 | Status: COMPLETED | COMMUNITY
Start: 2021-04-09 | End: 2022-08-23

## 2022-08-23 NOTE — HISTORY OF PRESENT ILLNESS
[de-identified] : Patient is a 72-year-old gentleman who presents today for follow-up and disease management patient does have multiple underlying medical problems which we will be addressing.  History is significant for hypertension, elevated BMI, diabetes mellitus type 2 non-insulin-requiring, peripheral vascular disease, hyperlipidemia, history of colon cancer status post resection patient is in remission, and bladder cancer patient doing well and also in remission.\par \par Presently the patient is awake alert and oriented x3 in no acute distress calm and cooperative patient does admit to dyspnea with exertion at rest there is no problem he has underlying chronic obstructive pulmonary disease which is mild but occasionally does act up.

## 2022-08-23 NOTE — ASSESSMENT
[FreeTextEntry1] : Assessment and plan:\par \par 1.  Hypertension when first taken blood pressure was slightly elevated approximately 30 minutes later blood pressure dropped to 148/80.  I discussed with patient weight loss program, lifestyle changes and sodium intake and I recommend that the patient continue clonidine 0.1 mg 1 tablet twice daily, hydralazine 50 mg twice a day metoprolol succinate 50 mg p.o. daily and valsartan hydrochlorothiazide 300–25 1 tablet daily.  I will increase hydralazine to 50 mg 3 times a day patient will be checking home blood pressure will notify me if the response to the increase in medications.\par \par 2.  Elevated BMI detailed discussion with patient regarding weight loss program increase physical activity, no concentrated sweets consistent carbohydrates, low-fat low-cholesterol and portion control.\par \par 3.  Diabetes mellitus type 2 non-insulin-requiring I recommend no concentrated sweets consistent carbohydrate diet and patient will continue Metformin which has been increased with 1000 mg twice a day if necessary I will add other medications to decrease his blood sugar.  Comprehensive blood work was drawn in office by examiner at today's visit and I will be checking hemoglobin A1c.  Patient's hemoglobin A1c at last visit was 6.4 which is excellent control.\par \par 4.  Hyperlipidemia continue low-fat low-cholesterol diet and simvastatin 20 mg p.o. daily at bedtime.\par \par 5.  Peripheral vascular disease patient does have venous insufficiency edema did improve after stopping amlodipine but still present worsens as the day progresses and it is at its maximum at bedtime.  Unfortunately the patient has attempted support hose he is not capable of tolerating that he said he is more capable of tolerating the edema.  Therefore continue conservative management patient is doing well.\par \par 6.  History of colon cancer recent colonoscopy done by Dr. Moody shows that there is no recurrence and it is recommended that the patient have colonoscopy redone in 3 years therefore the patient's colon cancer is in remission.  Patient denies any blood in his stool and at today's visit I will be obtaining comprehensive blood work including carcinoembryonic antigen.\par \par 7.  Bladder cancer patient doing well he denies any hematuria and is followed closely by urology.  We will obtain urine analysis.\par \par 8.  Patient is up-to-date with COVID-19 vaccination patient received both doses of Moderna including first booster patient is candidate for second booster.\par \par 9.  Comprehensive blood work drawn in office by examiner, health maintenance issues discussed with patient, all medications reviewed counseling and coordination of care regarding diabetes mellitus with complications such as peripheral vascular disease presently stable and doing well total time spent face-to-face and non-face-to-face time 45 minutes.

## 2022-08-23 NOTE — COUNSELING
[Fall prevention counseling provided] : Fall prevention counseling provided [Adequate lighting] : Adequate lighting [No throw rugs] : No throw rugs [Use proper foot wear] : Use proper foot wear [Behavioral health counseling provided] : Behavioral health counseling provided [Sleep ___ hours/day] : Sleep [unfilled] hours/day [Engage in a relaxing activity] : Engage in a relaxing activity [Plan in advance] : Plan in advance [AUDIT-C Screening administered and reviewed] : AUDIT-C Screening administered and reviewed [Potential consequences of obesity discussed] : Potential consequences of obesity discussed [Benefits of weight loss discussed] : Benefits of weight loss discussed [Structured Weight Management Program suggested:] : Structured weight management program suggested [Encouraged to maintain food diary] : Encouraged to maintain food diary [Encouraged to increase physical activity] : Encouraged to increase physical activity [Encouraged to use exercise tracking device] : Encouraged to use exercise tracking device [Target Wt Loss Goal ___] : Weight Loss Goals: Target weight loss goal [unfilled] lbs [Weigh Self Weekly] : weigh self weekly [Decrease Portions] : decrease portions [____ min/wk Activity] : [unfilled] min/wk activity [Keep Food Diary] : keep food diary [FreeTextEntry1] : NATALIE

## 2022-08-23 NOTE — PHYSICAL EXAM
[No Acute Distress] : no acute distress [Well Nourished] : well nourished [Well Developed] : well developed [Well-Appearing] : well-appearing [Normal Voice/Communication] : normal voice/communication [Normal Sclera/Conjunctiva] : normal sclera/conjunctiva [PERRL] : pupils equal round and reactive to light [EOMI] : extraocular movements intact [Normal Outer Ear/Nose] : the outer ears and nose were normal in appearance [Normal Oropharynx] : the oropharynx was normal [No JVD] : no jugular venous distention [No Lymphadenopathy] : no lymphadenopathy [Supple] : supple [Thyroid Normal, No Nodules] : the thyroid was normal and there were no nodules present [No Respiratory Distress] : no respiratory distress  [No Accessory Muscle Use] : no accessory muscle use [Clear to Auscultation] : lungs were clear to auscultation bilaterally [Normal Rate] : normal rate  [Regular Rhythm] : with a regular rhythm [Normal S1, S2] : normal S1 and S2 [No Murmur] : no murmur heard [No Carotid Bruits] : no carotid bruits [No Abdominal Bruit] : a ~M bruit was not heard ~T in the abdomen [No Varicosities] : no varicosities [Pedal Pulses Present] : the pedal pulses are present [No Palpable Aorta] : no palpable aorta [No Extremity Clubbing/Cyanosis] : no extremity clubbing/cyanosis [Soft] : abdomen soft [Non Tender] : non-tender [Non-distended] : non-distended [No Masses] : no abdominal mass palpated [No HSM] : no HSM [Normal Bowel Sounds] : normal bowel sounds [Normal Posterior Cervical Nodes] : no posterior cervical lymphadenopathy [Normal Anterior Cervical Nodes] : no anterior cervical lymphadenopathy [No CVA Tenderness] : no CVA  tenderness [No Spinal Tenderness] : no spinal tenderness [No Joint Swelling] : no joint swelling [Grossly Normal Strength/Tone] : grossly normal strength/tone [No Rash] : no rash [Coordination Grossly Intact] : coordination grossly intact [No Focal Deficits] : no focal deficits [Normal Gait] : normal gait [Deep Tendon Reflexes (DTR)] : deep tendon reflexes were 2+ and symmetric [Speech Grossly Normal] : speech grossly normal [Memory Grossly Normal] : memory grossly normal [Normal Affect] : the affect was normal [Alert and Oriented x3] : oriented to person, place, and time [Normal Mood] : the mood was normal [Normal Insight/Judgement] : insight and judgment were intact [Comprehensive Foot Exam Normal] : Right and left foot were examined and both feet are normal. No ulcers in either foot. Toes are normal and with full ROM.  Normal tactile sensation with monofilament testing throughout both feet [de-identified] : Bilateral lower extremity edema  [de-identified] : edema no sores

## 2022-08-23 NOTE — HEALTH RISK ASSESSMENT
[Former] : Former [20 or more] : 20 or more [Yes] : Yes [2 - 4 times a month (2 pts)] : 2-4 times a month (2 points) [1 or 2 (0 pts)] : 1 or 2 (0 points) [Never (0 pts)] : Never (0 points) [No] : In the past 12 months have you used drugs other than those required for medical reasons? No [No falls in past year] : Patient reported no falls in the past year [0] : 2) Feeling down, depressed, or hopeless: Not at all (0) [PHQ-2 Negative - No further assessment needed] : PHQ-2 Negative - No further assessment needed [With Patient/Caregiver] : , with patient/caregiver [Reviewed no changes] : Reviewed, no changes [Designated Healthcare Proxy] : Designated healthcare proxy [Name: ___] : Health Care Proxy's Name: [unfilled]  [Relationship: ___] : Relationship: [unfilled] [Aggressive treatment] : aggressive treatment [I will adhere to the patient's wishes.] : I will adhere to the patient's wishes. [YearQuit] : 1998 [Audit-CScore] : 2 [[id number]] : 0 [AdvancecareDate] : 08/22

## 2022-08-23 NOTE — CURRENT MEDS
[Takes medication as prescribed] : takes [None] : Patient does not have any barriers to medication adherence [No] : Did not review medication list for presence of high-risk medications. [FreeTextEntry1] : No high risk medications identified

## 2022-08-24 LAB
ALBUMIN SERPL ELPH-MCNC: 4.6 G/DL
ALP BLD-CCNC: 56 U/L
ALT SERPL-CCNC: 21 U/L
ANION GAP SERPL CALC-SCNC: 15 MMOL/L
APPEARANCE: CLEAR
AST SERPL-CCNC: 14 U/L
BACTERIA: NEGATIVE
BASOPHILS # BLD AUTO: 0.05 K/UL
BASOPHILS NFR BLD AUTO: 0.5 %
BILIRUB SERPL-MCNC: 0.4 MG/DL
BILIRUBIN URINE: NEGATIVE
BLOOD URINE: NEGATIVE
BUN SERPL-MCNC: 19 MG/DL
CALCIUM SERPL-MCNC: 9.9 MG/DL
CEA SERPL-MCNC: 2.4 NG/ML
CHLORIDE SERPL-SCNC: 101 MMOL/L
CHOLEST SERPL-MCNC: 135 MG/DL
CO2 SERPL-SCNC: 26 MMOL/L
COLOR: YELLOW
CREAT SERPL-MCNC: 0.91 MG/DL
CREAT SPEC-SCNC: 147 MG/DL
EGFR: 89 ML/MIN/1.73M2
EOSINOPHIL # BLD AUTO: 0.2 K/UL
EOSINOPHIL NFR BLD AUTO: 2.1 %
ESTIMATED AVERAGE GLUCOSE: 148 MG/DL
GLUCOSE QUALITATIVE U: NEGATIVE
GLUCOSE SERPL-MCNC: 163 MG/DL
HBA1C MFR BLD HPLC: 6.8 %
HCT VFR BLD CALC: 44.6 %
HDLC SERPL-MCNC: 30 MG/DL
HGB BLD-MCNC: 15 G/DL
HYALINE CASTS: 0 /LPF
IMM GRANULOCYTES NFR BLD AUTO: 0.3 %
KETONES URINE: NEGATIVE
LDLC SERPL CALC-MCNC: 70 MG/DL
LEUKOCYTE ESTERASE URINE: NEGATIVE
LYMPHOCYTES # BLD AUTO: 2.08 K/UL
LYMPHOCYTES NFR BLD AUTO: 22.3 %
MAN DIFF?: NORMAL
MCHC RBC-ENTMCNC: 29.9 PG
MCHC RBC-ENTMCNC: 33.6 GM/DL
MCV RBC AUTO: 88.8 FL
MICROALBUMIN 24H UR DL<=1MG/L-MCNC: 1.9 MG/DL
MICROALBUMIN/CREAT 24H UR-RTO: 13 MG/G
MICROSCOPIC-UA: NORMAL
MONOCYTES # BLD AUTO: 0.71 K/UL
MONOCYTES NFR BLD AUTO: 7.6 %
NEUTROPHILS # BLD AUTO: 6.26 K/UL
NEUTROPHILS NFR BLD AUTO: 67.2 %
NITRITE URINE: NEGATIVE
NONHDLC SERPL-MCNC: 105 MG/DL
PH URINE: 6.5
PLATELET # BLD AUTO: 235 K/UL
POTASSIUM SERPL-SCNC: 3.5 MMOL/L
PROT SERPL-MCNC: 6.9 G/DL
PROTEIN URINE: NORMAL
RBC # BLD: 5.02 M/UL
RBC # FLD: 13.1 %
RED BLOOD CELLS URINE: 2 /HPF
SODIUM SERPL-SCNC: 143 MMOL/L
SPECIFIC GRAVITY URINE: 1.02
SQUAMOUS EPITHELIAL CELLS: 0 /HPF
TRIGL SERPL-MCNC: 174 MG/DL
UROBILINOGEN URINE: NORMAL
WBC # FLD AUTO: 9.33 K/UL
WHITE BLOOD CELLS URINE: 1 /HPF

## 2022-10-12 ENCOUNTER — RX RENEWAL (OUTPATIENT)
Age: 72
End: 2022-10-12

## 2022-11-16 ENCOUNTER — RX RENEWAL (OUTPATIENT)
Age: 72
End: 2022-11-16

## 2022-12-12 ENCOUNTER — RX RENEWAL (OUTPATIENT)
Age: 72
End: 2022-12-12

## 2023-02-14 ENCOUNTER — NON-APPOINTMENT (OUTPATIENT)
Age: 73
End: 2023-02-14

## 2023-03-01 ENCOUNTER — APPOINTMENT (OUTPATIENT)
Dept: FAMILY MEDICINE | Facility: CLINIC | Age: 73
End: 2023-03-01

## 2023-03-23 ENCOUNTER — APPOINTMENT (OUTPATIENT)
Dept: FAMILY MEDICINE | Facility: CLINIC | Age: 73
End: 2023-03-23
Payer: MEDICARE

## 2023-03-23 VITALS
SYSTOLIC BLOOD PRESSURE: 137 MMHG | HEART RATE: 70 BPM | RESPIRATION RATE: 16 BRPM | BODY MASS INDEX: 33.07 KG/M2 | DIASTOLIC BLOOD PRESSURE: 79 MMHG | TEMPERATURE: 98 F | OXYGEN SATURATION: 95 % | WEIGHT: 231 LBS | HEIGHT: 70 IN

## 2023-03-23 DIAGNOSIS — Z00.00 ENCOUNTER FOR GENERAL ADULT MEDICAL EXAMINATION W/OUT ABNORMAL FINDINGS: ICD-10-CM

## 2023-03-23 PROCEDURE — 99215 OFFICE O/P EST HI 40 MIN: CPT | Mod: 25

## 2023-03-23 PROCEDURE — 36415 COLL VENOUS BLD VENIPUNCTURE: CPT

## 2023-03-23 NOTE — HISTORY OF PRESENT ILLNESS
[FreeTextEntry1] : Please see HPI [de-identified] : Patient is a 73-year-old gentleman who presents today for follow-up and disease management patient does have multiple underlying medical problems which we will be addressing.  History is significant for hypertension, elevated BMI, diabetes mellitus type 2 non-insulin-requiring, peripheral vascular disease, hyperlipidemia, history of colon cancer status post resection patient is in remission, and bladder cancer patient doing well and also in remission.\par \par Presently the patient is awake alert and oriented x3 in no acute distress calm and cooperative patient does admit to dyspnea with exertion at rest there is no problem he has underlying chronic obstructive pulmonary disease which is mild but occasionally does act up.

## 2023-03-23 NOTE — PHYSICAL EXAM
[No Acute Distress] : no acute distress [Well Nourished] : well nourished [Well Developed] : well developed [Well-Appearing] : well-appearing [Normal Voice/Communication] : normal voice/communication [Normal Sclera/Conjunctiva] : normal sclera/conjunctiva [PERRL] : pupils equal round and reactive to light [EOMI] : extraocular movements intact [Normal Outer Ear/Nose] : the outer ears and nose were normal in appearance [Normal Oropharynx] : the oropharynx was normal [No JVD] : no jugular venous distention [No Lymphadenopathy] : no lymphadenopathy [Supple] : supple [Thyroid Normal, No Nodules] : the thyroid was normal and there were no nodules present [No Respiratory Distress] : no respiratory distress  [No Accessory Muscle Use] : no accessory muscle use [Clear to Auscultation] : lungs were clear to auscultation bilaterally [Normal Rate] : normal rate  [Regular Rhythm] : with a regular rhythm [Normal S1, S2] : normal S1 and S2 [No Murmur] : no murmur heard [No Carotid Bruits] : no carotid bruits [No Abdominal Bruit] : a ~M bruit was not heard ~T in the abdomen [No Varicosities] : no varicosities [Pedal Pulses Present] : the pedal pulses are present [No Palpable Aorta] : no palpable aorta [No Extremity Clubbing/Cyanosis] : no extremity clubbing/cyanosis [Soft] : abdomen soft [Non Tender] : non-tender [Non-distended] : non-distended [No Masses] : no abdominal mass palpated [No HSM] : no HSM [Normal Bowel Sounds] : normal bowel sounds [No CVA Tenderness] : no CVA  tenderness [No Spinal Tenderness] : no spinal tenderness [No Joint Swelling] : no joint swelling [Grossly Normal Strength/Tone] : grossly normal strength/tone [No Rash] : no rash [Coordination Grossly Intact] : coordination grossly intact [No Focal Deficits] : no focal deficits [Normal Gait] : normal gait [Deep Tendon Reflexes (DTR)] : deep tendon reflexes were 2+ and symmetric [Speech Grossly Normal] : speech grossly normal [Memory Grossly Normal] : memory grossly normal [Normal Affect] : the affect was normal [Alert and Oriented x3] : oriented to person, place, and time [Normal Mood] : the mood was normal [Normal Insight/Judgement] : insight and judgment were intact [Comprehensive Foot Exam Normal] : Right and left foot were examined and both feet are normal. No ulcers in either foot. Toes are normal and with full ROM.  Normal tactile sensation with monofilament testing throughout both feet [de-identified] : Bilateral lower extremity edema  [de-identified] : edema no sores

## 2023-03-23 NOTE — ASSESSMENT
[FreeTextEntry1] : Assessment and plan:\par \par 1.  Hypertension when first taken blood pressure was slightly elevated approximately 30 minutes later blood pressure dropped to 137/79.  I discussed with patient weight loss program, lifestyle changes and sodium intake and I recommend that the patient continue clonidine 0.1 mg 1 tablet twice daily, hydralazine 50 mg 3 times  a day metoprolol succinate 50 mg p.o. daily and valsartan hydrochlorothiazide 300–25 1 tablet daily.  \par \par 2.  Elevated BMI detailed discussion with patient regarding weight loss program increase physical activity, no concentrated sweets consistent carbohydrates, low-fat low-cholesterol and portion control.\par \par 3.  Diabetes mellitus type 2 non-insulin-requiring I recommend no concentrated sweets consistent carbohydrate diet and patient will continue Metformin which has been increased with 1000 mg twice a day if necessary I will add other medications to decrease his blood sugar.  Comprehensive blood work was drawn in office by examiner at today's visit and I will be checking hemoglobin A1c.  Patient's hemoglobin A1c at last visit was 6.8 which is excellent control.  Patient's blood sugar is basically under good control but patient's BMI is elevated therefore he would be a candidate for medications such as Ozempic, Trulicity etc.\par \par 4.  Hyperlipidemia continue low-fat low-cholesterol diet and simvastatin 20 mg p.o. daily at bedtime.\par \par 5.  Peripheral vascular disease patient does have venous insufficiency edema did improve after stopping amlodipine but still present worsens as the day progresses and it is at its maximum at bedtime.  Unfortunately the patient has attempted support hose he is not capable of tolerating that he said he is more capable of tolerating the edema.  Therefore continue conservative management patient is doing well.\par \par 6.  History of colon cancer recent colonoscopy done by Dr. Moody shows that there is no recurrence and it is recommended that the patient have colonoscopy redone in 3 years therefore the patient's colon cancer is in remission.  Patient denies any blood in his stool and at today's visit I will be obtaining comprehensive blood work including carcinoembryonic antigen.\par \par 7.  Bladder cancer patient doing well he denies any hematuria and is followed closely by urology.  We will obtain urine analysis.\par \par 8.  Comprehensive blood work drawn in office by examiner, health maintenance issues discussed with patient, all medications reviewed counseling and coordination of care regarding diabetes mellitus with complications such as peripheral vascular disease presently stable and doing well total time spent face-to-face and non-face-to-face time 45 minutes.

## 2023-03-23 NOTE — HEALTH RISK ASSESSMENT
[Yes] : Yes [2 - 4 times a month (2 pts)] : 2-4 times a month (2 points) [1 or 2 (0 pts)] : 1 or 2 (0 points) [Never (0 pts)] : Never (0 points) [No] : In the past 12 months have you used drugs other than those required for medical reasons? No [No falls in past year] : Patient reported no falls in the past year [0] : 2) Feeling down, depressed, or hopeless: Not at all (0) [PHQ-2 Negative - No further assessment needed] : PHQ-2 Negative - No further assessment needed [Audit-CScore] : 2 [[id number]] : 0 [With Patient/Caregiver] : , with patient/caregiver [Reviewed no changes] : Reviewed, no changes [Designated Healthcare Proxy] : Designated healthcare proxy [Relationship: ___] : Relationship: [unfilled] [Aggressive treatment] : aggressive treatment [AdvancecareDate] : 03/23 [Former] : Former [20 or more] : 20 or more [> 15 Years] : > 15 Years

## 2023-03-24 LAB
ALBUMIN SERPL ELPH-MCNC: 4.5 G/DL
ALP BLD-CCNC: 61 U/L
ALT SERPL-CCNC: 21 U/L
ANION GAP SERPL CALC-SCNC: 14 MMOL/L
APPEARANCE: CLEAR
AST SERPL-CCNC: 17 U/L
BACTERIA: NEGATIVE
BILIRUB SERPL-MCNC: 0.4 MG/DL
BILIRUBIN URINE: NEGATIVE
BLOOD URINE: NEGATIVE
BUN SERPL-MCNC: 19 MG/DL
CALCIUM SERPL-MCNC: 9.9 MG/DL
CEA SERPL-MCNC: 1.9 NG/ML
CHLORIDE SERPL-SCNC: 100 MMOL/L
CHOLEST SERPL-MCNC: 138 MG/DL
CO2 SERPL-SCNC: 27 MMOL/L
COLOR: NORMAL
CREAT SERPL-MCNC: 0.84 MG/DL
CREAT SPEC-SCNC: 97 MG/DL
EGFR: 92 ML/MIN/1.73M2
ESTIMATED AVERAGE GLUCOSE: 154 MG/DL
GLUCOSE QUALITATIVE U: NEGATIVE
GLUCOSE SERPL-MCNC: 166 MG/DL
HBA1C MFR BLD HPLC: 7 %
HCT VFR BLD CALC: 43.9 %
HDLC SERPL-MCNC: 27 MG/DL
HGB BLD-MCNC: 14.4 G/DL
HYALINE CASTS: 0 /LPF
KETONES URINE: NEGATIVE
LDLC SERPL CALC-MCNC: 75 MG/DL
LEUKOCYTE ESTERASE URINE: NEGATIVE
MCHC RBC-ENTMCNC: 29.4 PG
MCHC RBC-ENTMCNC: 32.8 GM/DL
MCV RBC AUTO: 89.8 FL
MICROALBUMIN 24H UR DL<=1MG/L-MCNC: <1.2 MG/DL
MICROALBUMIN/CREAT 24H UR-RTO: NORMAL MG/G
MICROSCOPIC-UA: NORMAL
NITRITE URINE: NEGATIVE
NONHDLC SERPL-MCNC: 111 MG/DL
PH URINE: 6.5
PLATELET # BLD AUTO: 228 K/UL
POTASSIUM SERPL-SCNC: 3.4 MMOL/L
PROT SERPL-MCNC: 6.9 G/DL
PROTEIN URINE: NEGATIVE
PSA SERPL-MCNC: 2.75 NG/ML
RBC # BLD: 4.89 M/UL
RBC # FLD: 12.9 %
RED BLOOD CELLS URINE: 2 /HPF
SODIUM SERPL-SCNC: 141 MMOL/L
SPECIFIC GRAVITY URINE: 1.02
SQUAMOUS EPITHELIAL CELLS: 0 /HPF
TRIGL SERPL-MCNC: 181 MG/DL
UROBILINOGEN URINE: NORMAL
WBC # FLD AUTO: 8.47 K/UL
WHITE BLOOD CELLS URINE: 0 /HPF

## 2023-06-28 ENCOUNTER — NON-APPOINTMENT (OUTPATIENT)
Age: 73
End: 2023-06-28

## 2023-06-30 ENCOUNTER — APPOINTMENT (OUTPATIENT)
Dept: FAMILY MEDICINE | Facility: CLINIC | Age: 73
End: 2023-06-30
Payer: MEDICARE

## 2023-06-30 VITALS
HEART RATE: 56 BPM | SYSTOLIC BLOOD PRESSURE: 146 MMHG | WEIGHT: 236 LBS | TEMPERATURE: 97.8 F | HEIGHT: 70 IN | DIASTOLIC BLOOD PRESSURE: 73 MMHG | OXYGEN SATURATION: 97 % | RESPIRATION RATE: 16 BRPM | BODY MASS INDEX: 33.79 KG/M2

## 2023-06-30 VITALS — SYSTOLIC BLOOD PRESSURE: 140 MMHG | DIASTOLIC BLOOD PRESSURE: 70 MMHG

## 2023-06-30 DIAGNOSIS — G47.33 OBSTRUCTIVE SLEEP APNEA (ADULT) (PEDIATRIC): ICD-10-CM

## 2023-06-30 DIAGNOSIS — E66.9 OBESITY, UNSPECIFIED: ICD-10-CM

## 2023-06-30 PROCEDURE — 36415 COLL VENOUS BLD VENIPUNCTURE: CPT

## 2023-06-30 PROCEDURE — 99214 OFFICE O/P EST MOD 30 MIN: CPT | Mod: 25

## 2023-06-30 NOTE — PHYSICAL EXAM
[No Acute Distress] : no acute distress [Well Nourished] : well nourished [Well Developed] : well developed [Well-Appearing] : well-appearing [Normal Voice/Communication] : normal voice/communication [Normal Sclera/Conjunctiva] : normal sclera/conjunctiva [PERRL] : pupils equal round and reactive to light [EOMI] : extraocular movements intact [Normal Outer Ear/Nose] : the outer ears and nose were normal in appearance [Normal Oropharynx] : the oropharynx was normal [No JVD] : no jugular venous distention [No Lymphadenopathy] : no lymphadenopathy [Supple] : supple [Thyroid Normal, No Nodules] : the thyroid was normal and there were no nodules present [No Respiratory Distress] : no respiratory distress  [No Accessory Muscle Use] : no accessory muscle use [Clear to Auscultation] : lungs were clear to auscultation bilaterally [Normal Rate] : normal rate  [Regular Rhythm] : with a regular rhythm [Normal S1, S2] : normal S1 and S2 [No Murmur] : no murmur heard [No Carotid Bruits] : no carotid bruits [No Abdominal Bruit] : a ~M bruit was not heard ~T in the abdomen [No Varicosities] : no varicosities [Pedal Pulses Present] : the pedal pulses are present [No Palpable Aorta] : no palpable aorta [No Extremity Clubbing/Cyanosis] : no extremity clubbing/cyanosis [Soft] : abdomen soft [Non Tender] : non-tender [Non-distended] : non-distended [No Masses] : no abdominal mass palpated [No HSM] : no HSM [Normal Bowel Sounds] : normal bowel sounds [No CVA Tenderness] : no CVA  tenderness [No Spinal Tenderness] : no spinal tenderness [No Joint Swelling] : no joint swelling [Grossly Normal Strength/Tone] : grossly normal strength/tone [No Rash] : no rash [Coordination Grossly Intact] : coordination grossly intact [No Focal Deficits] : no focal deficits [Normal Gait] : normal gait [Deep Tendon Reflexes (DTR)] : deep tendon reflexes were 2+ and symmetric [Speech Grossly Normal] : speech grossly normal [Memory Grossly Normal] : memory grossly normal [Normal Affect] : the affect was normal [Alert and Oriented x3] : oriented to person, place, and time [Normal Mood] : the mood was normal [Normal Insight/Judgement] : insight and judgment were intact [Comprehensive Foot Exam Normal] : Right and left foot were examined and both feet are normal. No ulcers in either foot. Toes are normal and with full ROM.  Normal tactile sensation with monofilament testing throughout both feet [de-identified] : Bilateral lower extremity edema  [de-identified] : edema no sores

## 2023-06-30 NOTE — ASSESSMENT
[FreeTextEntry1] : Assessment and plan:\par \par 1.  Hypertension second blood pressure came down to 140/70 continue present medical management with clonidine 0.1 mg twice a day, hydralazine 50 mg 3 times a day, metoprolol ER 50 mg p.o. daily and valsartan hydrochlorothiazide 3 20–25 1 tablet daily.\par \par 2.  Elevated BMI detailed discussion with patient regarding weight loss program patient is also diabetic with obesity therefore we will attempt Ozempic 0.25 mg titrate up to maintenance dose which will help with his blood sugar and also with his weight.  Prescription sent to pharmacy.\par \par 3.  Hyperlipidemia continue low-fat low-cholesterol diet and simvastatin 20 mg p.o. at bedtime.\par \par 4.  PVD stable no change compared to previous.\par \par 5.  History of colon CA and also bladder CA both of which are in remission I will be checking Carcinoembryonic antigen.\par \par 6.  Diabetes mellitus type 2 continue metformin 1000 mg twice a day with meals added Ozempic 0.25 mg subcutaneously weekly and slowly titrate up.\par \par 7.  Obstructive sleep apnea patient stopped CPAP he states that he raised the head of his bed is sleeping better and is breathing better.\par \par 8.  Discussed with patient screening for Farias's he has already been approach he presently declines.

## 2023-06-30 NOTE — HEALTH RISK ASSESSMENT
[Yes] : Yes [2 - 4 times a month (2 pts)] : 2-4 times a month (2 points) [1 or 2 (0 pts)] : 1 or 2 (0 points) [Never (0 pts)] : Never (0 points) [No] : In the past 12 months have you used drugs other than those required for medical reasons? No [No falls in past year] : Patient reported no falls in the past year [0] : 2) Feeling down, depressed, or hopeless: Not at all (0) [PHQ-2 Negative - No further assessment needed] : PHQ-2 Negative - No further assessment needed [With Patient/Caregiver] : , with patient/caregiver [Reviewed no changes] : Reviewed, no changes [I will adhere to the patient's wishes.] : I will adhere to the patient's wishes. [Former] : Former [20 or more] : 20 or more [> 15 Years] : > 15 Years [Audit-CScore] : 2 [[id number]] : 0 [AdvancecareDate] : 06/23

## 2023-06-30 NOTE — CURRENT MEDS
[Takes medication as prescribed] : takes [None] : Patient does not have any barriers to medication adherence [No] : Did not review medication list for presence of high-risk medications. Was done through completion of first feed

## 2023-06-30 NOTE — HISTORY OF PRESENT ILLNESS
[FreeTextEntry1] : Please see HPI [de-identified] : Patient is a 73-year-old gentleman who presents today for follow-up and disease management patient does have multiple underlying medical problems which we will be addressing.  History is significant for hypertension, elevated BMI, diabetes mellitus type 2 non-insulin-requiring, peripheral vascular disease, hyperlipidemia, history of colon cancer status post resection patient is in remission, and bladder cancer patient doing well and also in remission.\par \par Presently the patient is awake alert and oriented x3 in no acute distress calm and cooperative patient does admit to dyspnea with exertion at rest there is no problem he has underlying chronic obstructive pulmonary disease which is mild but occasionally does act up.

## 2023-07-02 LAB
ALBUMIN SERPL ELPH-MCNC: 4.7 G/DL
ALP BLD-CCNC: 65 U/L
ALT SERPL-CCNC: 28 U/L
ANION GAP SERPL CALC-SCNC: 16 MMOL/L
AST SERPL-CCNC: 16 U/L
BILIRUB SERPL-MCNC: 0.3 MG/DL
BUN SERPL-MCNC: 22 MG/DL
CALCIUM SERPL-MCNC: 9.8 MG/DL
CEA SERPL-MCNC: 2.8 NG/ML
CHLORIDE SERPL-SCNC: 100 MMOL/L
CHOLEST SERPL-MCNC: 153 MG/DL
CO2 SERPL-SCNC: 26 MMOL/L
CREAT SERPL-MCNC: 0.88 MG/DL
EGFR: 90 ML/MIN/1.73M2
ESTIMATED AVERAGE GLUCOSE: 160 MG/DL
GLUCOSE SERPL-MCNC: 182 MG/DL
HBA1C MFR BLD HPLC: 7.2 %
HDLC SERPL-MCNC: 34 MG/DL
LDLC SERPL CALC-MCNC: 80 MG/DL
NONHDLC SERPL-MCNC: 120 MG/DL
POTASSIUM SERPL-SCNC: 3.5 MMOL/L
PROT SERPL-MCNC: 7.1 G/DL
SODIUM SERPL-SCNC: 142 MMOL/L
TRIGL SERPL-MCNC: 197 MG/DL

## 2023-07-12 ENCOUNTER — RX RENEWAL (OUTPATIENT)
Age: 73
End: 2023-07-12

## 2023-08-05 ENCOUNTER — RX RENEWAL (OUTPATIENT)
Age: 73
End: 2023-08-05

## 2023-10-16 ENCOUNTER — APPOINTMENT (OUTPATIENT)
Dept: FAMILY MEDICINE | Facility: CLINIC | Age: 73
End: 2023-10-16
Payer: MEDICARE

## 2023-10-16 VITALS
HEIGHT: 70 IN | BODY MASS INDEX: 33.36 KG/M2 | WEIGHT: 233 LBS | SYSTOLIC BLOOD PRESSURE: 146 MMHG | HEART RATE: 76 BPM | DIASTOLIC BLOOD PRESSURE: 77 MMHG | OXYGEN SATURATION: 96 % | TEMPERATURE: 97.7 F | RESPIRATION RATE: 16 BRPM

## 2023-10-16 DIAGNOSIS — I73.9 PERIPHERAL VASCULAR DISEASE, UNSPECIFIED: ICD-10-CM

## 2023-10-16 PROCEDURE — 99214 OFFICE O/P EST MOD 30 MIN: CPT | Mod: 25

## 2023-10-16 PROCEDURE — 36415 COLL VENOUS BLD VENIPUNCTURE: CPT

## 2023-10-17 LAB
ALBUMIN SERPL ELPH-MCNC: 4.6 G/DL
ALP BLD-CCNC: 66 U/L
ALT SERPL-CCNC: 31 U/L
ANION GAP SERPL CALC-SCNC: 14 MMOL/L
APPEARANCE: CLEAR
AST SERPL-CCNC: 19 U/L
BACTERIA: NEGATIVE /HPF
BASOPHILS # BLD AUTO: 0.04 K/UL
BASOPHILS NFR BLD AUTO: 0.5 %
BILIRUB SERPL-MCNC: 0.3 MG/DL
BILIRUBIN URINE: NEGATIVE
BLOOD URINE: NEGATIVE
BUN SERPL-MCNC: 21 MG/DL
CALCIUM SERPL-MCNC: 9.4 MG/DL
CAST: 0 /LPF
CEA SERPL-MCNC: 2.4 NG/ML
CHLORIDE SERPL-SCNC: 99 MMOL/L
CHOLEST SERPL-MCNC: 155 MG/DL
CO2 SERPL-SCNC: 28 MMOL/L
COLOR: YELLOW
CREAT SERPL-MCNC: 0.97 MG/DL
CREAT SPEC-SCNC: 123 MG/DL
EGFR: 82 ML/MIN/1.73M2
EOSINOPHIL # BLD AUTO: 0.14 K/UL
EOSINOPHIL NFR BLD AUTO: 1.6 %
EPITHELIAL CELLS: 0 /HPF
ESTIMATED AVERAGE GLUCOSE: 174 MG/DL
GLUCOSE QUALITATIVE U: NEGATIVE MG/DL
GLUCOSE SERPL-MCNC: 223 MG/DL
HBA1C MFR BLD HPLC: 7.7 %
HCT VFR BLD CALC: 45.2 %
HDLC SERPL-MCNC: 32 MG/DL
HGB BLD-MCNC: 15.2 G/DL
IMM GRANULOCYTES NFR BLD AUTO: 0.2 %
KETONES URINE: NEGATIVE MG/DL
LDLC SERPL CALC-MCNC: 87 MG/DL
LEUKOCYTE ESTERASE URINE: NEGATIVE
LYMPHOCYTES # BLD AUTO: 1.55 K/UL
LYMPHOCYTES NFR BLD AUTO: 17.5 %
MAN DIFF?: NORMAL
MCHC RBC-ENTMCNC: 29.5 PG
MCHC RBC-ENTMCNC: 33.6 GM/DL
MCV RBC AUTO: 87.8 FL
MICROALBUMIN 24H UR DL<=1MG/L-MCNC: 6.2 MG/DL
MICROALBUMIN/CREAT 24H UR-RTO: 51 MG/G
MICROSCOPIC-UA: NORMAL
MONOCYTES # BLD AUTO: 0.53 K/UL
MONOCYTES NFR BLD AUTO: 6 %
NEUTROPHILS # BLD AUTO: 6.6 K/UL
NEUTROPHILS NFR BLD AUTO: 74.2 %
NITRITE URINE: NEGATIVE
NONHDLC SERPL-MCNC: 123 MG/DL
PH URINE: 6
PLATELET # BLD AUTO: 240 K/UL
POTASSIUM SERPL-SCNC: 3.4 MMOL/L
PROT SERPL-MCNC: 7.3 G/DL
PROTEIN URINE: NORMAL MG/DL
RBC # BLD: 5.15 M/UL
RBC # FLD: 13.1 %
RED BLOOD CELLS URINE: 2 /HPF
SODIUM SERPL-SCNC: 140 MMOL/L
SPECIFIC GRAVITY URINE: 1.02
TRIGL SERPL-MCNC: 214 MG/DL
UROBILINOGEN URINE: 0.2 MG/DL
WBC # FLD AUTO: 8.88 K/UL
WHITE BLOOD CELLS URINE: 0 /HPF

## 2023-12-13 ENCOUNTER — RX RENEWAL (OUTPATIENT)
Age: 73
End: 2023-12-13

## 2023-12-13 RX ORDER — METOPROLOL SUCCINATE 50 MG/1
50 TABLET, EXTENDED RELEASE ORAL
Qty: 90 | Refills: 3 | Status: ACTIVE | COMMUNITY
Start: 2018-05-03 | End: 1900-01-01

## 2023-12-18 ENCOUNTER — RX RENEWAL (OUTPATIENT)
Age: 73
End: 2023-12-18

## 2023-12-18 RX ORDER — CLONIDINE HYDROCHLORIDE 0.1 MG/1
0.1 TABLET ORAL
Qty: 180 | Refills: 3 | Status: ACTIVE | COMMUNITY
Start: 2017-04-03 | End: 1900-01-01

## 2023-12-18 RX ORDER — VALSARTAN AND HYDROCHLOROTHIAZIDE 320; 25 MG/1; MG/1
320-25 TABLET, FILM COATED ORAL
Qty: 90 | Refills: 3 | Status: ACTIVE | COMMUNITY
Start: 2017-07-14 | End: 1900-01-01

## 2023-12-25 ENCOUNTER — NON-APPOINTMENT (OUTPATIENT)
Age: 73
End: 2023-12-25

## 2023-12-30 ENCOUNTER — RX RENEWAL (OUTPATIENT)
Age: 73
End: 2023-12-30

## 2023-12-30 RX ORDER — SEMAGLUTIDE 0.68 MG/ML
2 INJECTION, SOLUTION SUBCUTANEOUS
Qty: 4 | Refills: 3 | Status: ACTIVE | COMMUNITY
Start: 2023-06-30 | End: 1900-01-01

## 2024-01-15 ENCOUNTER — TRANSCRIPTION ENCOUNTER (OUTPATIENT)
Age: 74
End: 2024-01-15

## 2024-01-24 ENCOUNTER — APPOINTMENT (OUTPATIENT)
Dept: FAMILY MEDICINE | Facility: CLINIC | Age: 74
End: 2024-01-24
Payer: MEDICARE

## 2024-01-24 VITALS
BODY MASS INDEX: 31.64 KG/M2 | HEART RATE: 89 BPM | OXYGEN SATURATION: 96 % | WEIGHT: 221 LBS | TEMPERATURE: 97.2 F | SYSTOLIC BLOOD PRESSURE: 133 MMHG | HEIGHT: 70 IN | DIASTOLIC BLOOD PRESSURE: 70 MMHG | RESPIRATION RATE: 16 BRPM

## 2024-01-24 DIAGNOSIS — N39.0 URINARY TRACT INFECTION, SITE NOT SPECIFIED: ICD-10-CM

## 2024-01-24 PROCEDURE — 36415 COLL VENOUS BLD VENIPUNCTURE: CPT

## 2024-01-24 PROCEDURE — 99214 OFFICE O/P EST MOD 30 MIN: CPT

## 2024-01-24 NOTE — HEALTH RISK ASSESSMENT
[Yes] : Yes [2 - 4 times a month (2 pts)] : 2-4 times a month (2 points) [1 or 2 (0 pts)] : 1 or 2 (0 points) [Never (0 pts)] : Never (0 points) [No] : In the past 12 months have you used drugs other than those required for medical reasons? No [No falls in past year] : Patient reported no falls in the past year [Little interest or pleasure doing things] : 1) Little interest or pleasure doing things [Feeling down, depressed, or hopeless] : 2) Feeling down, depressed, or hopeless [0] : 2) Feeling down, depressed, or hopeless: Not at all (0) [PHQ-2 Negative - No further assessment needed] : PHQ-2 Negative - No further assessment needed [Audit-CScore] : 2 [[id number]] : 0 [With Patient/Caregiver] : , with patient/caregiver [Reviewed no changes] : Reviewed, no changes [Designated Healthcare Proxy] : Designated healthcare proxy [Relationship: ___] : Relationship: [unfilled] [Aggressive treatment] : aggressive treatment [AdvancecareDate] : 01/24 [Former] : Former [20 or more] : 20 or more [> 15 Years] : > 15 Years

## 2024-01-24 NOTE — PHYSICAL EXAM
[No Acute Distress] : no acute distress [Well Nourished] : well nourished [Well Developed] : well developed [Well-Appearing] : well-appearing [Normal Voice/Communication] : normal voice/communication [Normal Sclera/Conjunctiva] : normal sclera/conjunctiva [PERRL] : pupils equal round and reactive to light [EOMI] : extraocular movements intact [Normal Outer Ear/Nose] : the outer ears and nose were normal in appearance [Normal Oropharynx] : the oropharynx was normal [No JVD] : no jugular venous distention [No Lymphadenopathy] : no lymphadenopathy [Supple] : supple [Thyroid Normal, No Nodules] : the thyroid was normal and there were no nodules present [No Respiratory Distress] : no respiratory distress  [No Accessory Muscle Use] : no accessory muscle use [Clear to Auscultation] : lungs were clear to auscultation bilaterally [Normal Rate] : normal rate  [Regular Rhythm] : with a regular rhythm [Normal S1, S2] : normal S1 and S2 [No Murmur] : no murmur heard [No Carotid Bruits] : no carotid bruits [No Abdominal Bruit] : a ~M bruit was not heard ~T in the abdomen [No Varicosities] : no varicosities [Pedal Pulses Present] : the pedal pulses are present [No Palpable Aorta] : no palpable aorta [No Extremity Clubbing/Cyanosis] : no extremity clubbing/cyanosis [Soft] : abdomen soft [Non Tender] : non-tender [Non-distended] : non-distended [No Masses] : no abdominal mass palpated [No HSM] : no HSM [Normal Bowel Sounds] : normal bowel sounds [No CVA Tenderness] : no CVA  tenderness [No Spinal Tenderness] : no spinal tenderness [No Joint Swelling] : no joint swelling [Grossly Normal Strength/Tone] : grossly normal strength/tone [No Rash] : no rash [Coordination Grossly Intact] : coordination grossly intact [No Focal Deficits] : no focal deficits [Normal Gait] : normal gait [Deep Tendon Reflexes (DTR)] : deep tendon reflexes were 2+ and symmetric [Speech Grossly Normal] : speech grossly normal [Memory Grossly Normal] : memory grossly normal [Normal Affect] : the affect was normal [Alert and Oriented x3] : oriented to person, place, and time [Normal Mood] : the mood was normal [Normal Insight/Judgement] : insight and judgment were intact [Comprehensive Foot Exam Normal] : Right and left foot were examined and both feet are normal. No ulcers in either foot. Toes are normal and with full ROM.  Normal tactile sensation with monofilament testing throughout both feet [de-identified] : Bilateral lower extremity edema  [de-identified] : edema no sores

## 2024-01-24 NOTE — ASSESSMENT
[FreeTextEntry1] : Assessment and plan:  1.  Status post urinary tract infection patient was treated empirically with antibiotics presently the patient states that hematuria, frequency, urgency and pain have totally resolved he does have a follow-up appointment with urology later this afternoon patient also has medical history which is significant for bladder cancer.  2.  Diabetes mellitus type 2 I will be checking hemoglobin A1c patient will continue Ozempic 0.5 mg and metformin 1000 mg twice a day the metformin is weekly patient also follows a no concentrated sweets consistent carbohydrate diet.  3.  Hyperlipidemia continue low-fat low-cholesterol diet, increase physical activity, weight loss program and simvastatin 20 mg at bedtime.  4.  Hypertension finally excellent blood pressure control with clonidine 0.1 mg twice a day hydralazine 50 mg 3 times a day metoprolol succinate ER 50 mg daily and valsartan hydrochlorothiazide 3 20-25 1 daily.  5.  Comprehensive blood work drawn in office by examiner total time spent face-to-face and non-face-to-face time 35 minutes the majority of which was spent on counseling and coordination of care

## 2024-01-24 NOTE — HISTORY OF PRESENT ILLNESS
[de-identified] : Patient is a 74-year-old gentleman who presents today for follow-up and disease management patient does have multiple underlying medical problems which we will be addressing.  History is significant for hypertension, elevated BMI, diabetes mellitus type 2 non-insulin-requiring, peripheral vascular disease, hyperlipidemia, history of colon cancer status post resection patient is in remission, and bladder cancer patient doing well and also in remission.  Presently the patient is awake alert and oriented x3 in no acute distress calm and cooperative patient does admit to dyspnea with exertion at rest there is no problem he has underlying chronic obstructive pulmonary disease which is mild but occasionally does act up. [FreeTextEntry1] : Follow-up and disease management patient is status post urinary tract infection with hematuria presently feeling well has appointment with urology later today..  At today's visit I will be obtaining comprehensive blood work.

## 2024-01-25 LAB
ALBUMIN SERPL ELPH-MCNC: 4.4 G/DL
ALP BLD-CCNC: 70 U/L
ALT SERPL-CCNC: 19 U/L
ANION GAP SERPL CALC-SCNC: 17 MMOL/L
AST SERPL-CCNC: 18 U/L
BILIRUB SERPL-MCNC: 0.3 MG/DL
BUN SERPL-MCNC: 23 MG/DL
CALCIUM SERPL-MCNC: 9.5 MG/DL
CEA SERPL-MCNC: 2.3 NG/ML
CHLORIDE SERPL-SCNC: 97 MMOL/L
CHOLEST SERPL-MCNC: 137 MG/DL
CO2 SERPL-SCNC: 24 MMOL/L
CREAT SERPL-MCNC: 0.93 MG/DL
EGFR: 86 ML/MIN/1.73M2
ESTIMATED AVERAGE GLUCOSE: 174 MG/DL
GLUCOSE SERPL-MCNC: 196 MG/DL
HBA1C MFR BLD HPLC: 7.7 %
HCT VFR BLD CALC: 47.5 %
HDLC SERPL-MCNC: 27 MG/DL
HGB BLD-MCNC: 15 G/DL
LDLC SERPL CALC-MCNC: 75 MG/DL
MCHC RBC-ENTMCNC: 27.8 PG
MCHC RBC-ENTMCNC: 31.6 GM/DL
MCV RBC AUTO: 88 FL
NONHDLC SERPL-MCNC: 110 MG/DL
PLATELET # BLD AUTO: 395 K/UL
POTASSIUM SERPL-SCNC: 3.5 MMOL/L
PROT SERPL-MCNC: 6.9 G/DL
RBC # BLD: 5.4 M/UL
RBC # FLD: 13 %
SODIUM SERPL-SCNC: 139 MMOL/L
TRIGL SERPL-MCNC: 206 MG/DL
TSH SERPL-ACNC: 1.41 UIU/ML
WBC # FLD AUTO: 10.72 K/UL

## 2024-03-11 ENCOUNTER — APPOINTMENT (OUTPATIENT)
Dept: SURGERY | Facility: CLINIC | Age: 74
End: 2024-03-11
Payer: MEDICARE

## 2024-03-11 VITALS
RESPIRATION RATE: 17 BRPM | OXYGEN SATURATION: 96 % | TEMPERATURE: 97.1 F | HEART RATE: 92 BPM | WEIGHT: 225 LBS | HEIGHT: 70 IN | BODY MASS INDEX: 32.21 KG/M2

## 2024-03-11 DIAGNOSIS — Z86.39 PERSONAL HISTORY OF OTHER ENDOCRINE, NUTRITIONAL AND METABOLIC DISEASE: ICD-10-CM

## 2024-03-11 DIAGNOSIS — Z82.49 FAMILY HISTORY OF ISCHEMIC HEART DISEASE AND OTHER DISEASES OF THE CIRCULATORY SYSTEM: ICD-10-CM

## 2024-03-11 DIAGNOSIS — Z87.09 PERSONAL HISTORY OF OTHER DISEASES OF THE RESPIRATORY SYSTEM: ICD-10-CM

## 2024-03-11 DIAGNOSIS — Z86.79 PERSONAL HISTORY OF OTHER DISEASES OF THE CIRCULATORY SYSTEM: ICD-10-CM

## 2024-03-11 PROCEDURE — 99203 OFFICE O/P NEW LOW 30 MIN: CPT

## 2024-03-11 RX ORDER — CIPROFLOXACIN HYDROCHLORIDE 500 MG/1
500 TABLET, FILM COATED ORAL
Qty: 20 | Refills: 0 | Status: COMPLETED | COMMUNITY
Start: 2024-01-15 | End: 2024-03-11

## 2024-03-11 RX ORDER — FINASTERIDE 5 MG/1
5 TABLET, FILM COATED ORAL
Qty: 30 | Refills: 0 | Status: ACTIVE | COMMUNITY
Start: 2024-02-28

## 2024-03-11 RX ORDER — SODIUM PICOSULFATE, MAGNESIUM OXIDE, AND ANHYDROUS CITRIC ACID 12; 3.5; 1 G/175ML; G/175ML; MG/175ML
10-3.5-12 MG-GM LIQUID ORAL
Qty: 1 | Refills: 0 | Status: ACTIVE | COMMUNITY
Start: 2024-03-11 | End: 1900-01-01

## 2024-03-11 NOTE — PHYSICAL EXAM
[Normal Breath Sounds] : Normal breath sounds [Normal Rate and Rhythm] : normal rate and rhythm [Normal Heart Sounds] : normal heart sounds [Abdomen Tenderness] : ~T ~M No abdominal tenderness [Abdominal Masses] : No abdominal masses [No Rash or Lesion] : No rash or lesion [de-identified] : nl [de-identified] : nl [de-identified] : nl

## 2024-03-11 NOTE — HISTORY OF PRESENT ILLNESS
[de-identified] : This 74 year old man last underwent a colonoscopy over five years ago. There is no FH of colon CA. The patient underwent a partial colectomy for Ca 15 years ago.

## 2024-03-11 NOTE — ASSESSMENT
[FreeTextEntry1] : Discussion regarding all options and risks  Bowel prep written and explained Scheduled for colonoscopy on 4/9/24 all lab values and imaging studies reviewed Discussed with Medicine

## 2024-04-12 RX ORDER — SODIUM PICOSULFATE, MAGNESIUM OXIDE, AND ANHYDROUS CITRIC ACID 12; 3.5; 1 G/175ML; G/175ML; MG/175ML
10-3.5-12 MG-GM LIQUID ORAL
Qty: 1 | Refills: 0 | Status: ACTIVE | COMMUNITY
Start: 2024-04-12 | End: 1900-01-01

## 2024-04-30 ENCOUNTER — APPOINTMENT (OUTPATIENT)
Dept: FAMILY MEDICINE | Facility: CLINIC | Age: 74
End: 2024-04-30
Payer: MEDICARE

## 2024-04-30 VITALS
WEIGHT: 224 LBS | HEIGHT: 70 IN | OXYGEN SATURATION: 95 % | TEMPERATURE: 97.3 F | HEART RATE: 71 BPM | SYSTOLIC BLOOD PRESSURE: 140 MMHG | RESPIRATION RATE: 16 BRPM | BODY MASS INDEX: 32.07 KG/M2 | DIASTOLIC BLOOD PRESSURE: 70 MMHG

## 2024-04-30 DIAGNOSIS — E11.9 TYPE 2 DIABETES MELLITUS W/OUT COMPLICATIONS: ICD-10-CM

## 2024-04-30 DIAGNOSIS — C18.9 MALIGNANT NEOPLASM OF COLON, UNSPECIFIED: ICD-10-CM

## 2024-04-30 DIAGNOSIS — J44.9 CHRONIC OBSTRUCTIVE PULMONARY DISEASE, UNSPECIFIED: ICD-10-CM

## 2024-04-30 DIAGNOSIS — I10 ESSENTIAL (PRIMARY) HYPERTENSION: ICD-10-CM

## 2024-04-30 DIAGNOSIS — C67.9 MALIGNANT NEOPLASM OF BLADDER, UNSPECIFIED: ICD-10-CM

## 2024-04-30 DIAGNOSIS — E78.5 HYPERLIPIDEMIA, UNSPECIFIED: ICD-10-CM

## 2024-04-30 PROCEDURE — 99214 OFFICE O/P EST MOD 30 MIN: CPT

## 2024-04-30 PROCEDURE — G2211 COMPLEX E/M VISIT ADD ON: CPT

## 2024-04-30 PROCEDURE — 36415 COLL VENOUS BLD VENIPUNCTURE: CPT

## 2024-04-30 NOTE — ASSESSMENT
[FreeTextEntry1] : Assessment and plan:  1.  Bladder cancer status post resection reviewed most recent urological evaluation which was done this past February patient will be following up with urology in the near future it appears that the patient is presently in remission.  2.  Diabetes mellitus type 2 I will be checking hemoglobin A1c patient will continue Ozempic 0.5 mg and metformin 1000 mg twice a day the metformin is weekly patient also follows a no concentrated sweets consistent carbohydrate diet.  Reviewed with patient his previous blood work hemoglobin A1c at that time was 7.7.  3.  Hyperlipidemia continue low-fat low-cholesterol diet, increase physical activity, weight loss program and simvastatin 20 mg at bedtime.  4.  Hypertension finally excellent blood pressure control with clonidine 0.1 mg twice a day hydralazine 50 mg 3 times a day metoprolol succinate ER 50 mg daily and valsartan hydrochlorothiazide 3 20-25 1 daily.  5.  History of colon cancer status post resection patient did have follow-up for repeat colonoscopy unfortunately on Ozempic and did not stop his medications prior to the colonoscopy therefore he will be having colonoscopy in the next couple of weeks.  6.  Chronic obstructive pulmonary disease patient definitely cannot walk more than 200 feet and he does complain of dyspnea on exertion I also recommend cardiology evaluation.That is  follow-up he was evaluated in the past by cardiology  7.  Comprehensive blood work drawn in office by examiner total time spent face-to-face and non-face-to-face time 35 minutes the majority of which was spent on counseling and coordination of care

## 2024-04-30 NOTE — HEALTH RISK ASSESSMENT
[Yes] : Yes [2 - 4 times a month (2 pts)] : 2-4 times a month (2 points) [1 or 2 (0 pts)] : 1 or 2 (0 points) [Never (0 pts)] : Never (0 points) [No] : In the past 12 months have you used drugs other than those required for medical reasons? No [No falls in past year] : Patient reported no falls in the past year [Little interest or pleasure doing things] : 1) Little interest or pleasure doing things [Feeling down, depressed, or hopeless] : 2) Feeling down, depressed, or hopeless [0] : 2) Feeling down, depressed, or hopeless: Not at all (0) [PHQ-2 Negative - No further assessment needed] : PHQ-2 Negative - No further assessment needed [Audit-CScore] : 2 [[id number]] : 0 [With Patient/Caregiver] : , with patient/caregiver [Reviewed no changes] : Reviewed, no changes [AdvancecareDate] : 04/24 [Former] : Former [20 or more] : 20 or more [> 15 Years] : > 15 Years

## 2024-04-30 NOTE — PHYSICAL EXAM
[No Acute Distress] : no acute distress [Well Nourished] : well nourished [Well Developed] : well developed [Well-Appearing] : well-appearing [Normal Voice/Communication] : normal voice/communication [Normal Sclera/Conjunctiva] : normal sclera/conjunctiva [PERRL] : pupils equal round and reactive to light [EOMI] : extraocular movements intact [Normal Outer Ear/Nose] : the outer ears and nose were normal in appearance [Normal Oropharynx] : the oropharynx was normal [No JVD] : no jugular venous distention [No Lymphadenopathy] : no lymphadenopathy [Supple] : supple [Thyroid Normal, No Nodules] : the thyroid was normal and there were no nodules present [No Respiratory Distress] : no respiratory distress  [No Accessory Muscle Use] : no accessory muscle use [Clear to Auscultation] : lungs were clear to auscultation bilaterally [Normal Rate] : normal rate  [Regular Rhythm] : with a regular rhythm [Normal S1, S2] : normal S1 and S2 [No Murmur] : no murmur heard [No Carotid Bruits] : no carotid bruits [No Abdominal Bruit] : a ~M bruit was not heard ~T in the abdomen [No Varicosities] : no varicosities [Pedal Pulses Present] : the pedal pulses are present [No Palpable Aorta] : no palpable aorta [No Extremity Clubbing/Cyanosis] : no extremity clubbing/cyanosis [Soft] : abdomen soft [Non Tender] : non-tender [Non-distended] : non-distended [No Masses] : no abdominal mass palpated [No HSM] : no HSM [Normal Bowel Sounds] : normal bowel sounds [No CVA Tenderness] : no CVA  tenderness [No Spinal Tenderness] : no spinal tenderness [No Joint Swelling] : no joint swelling [Grossly Normal Strength/Tone] : grossly normal strength/tone [No Rash] : no rash [Coordination Grossly Intact] : coordination grossly intact [No Focal Deficits] : no focal deficits [Normal Gait] : normal gait [Deep Tendon Reflexes (DTR)] : deep tendon reflexes were 2+ and symmetric [Speech Grossly Normal] : speech grossly normal [Memory Grossly Normal] : memory grossly normal [Normal Affect] : the affect was normal [Alert and Oriented x3] : oriented to person, place, and time [Normal Mood] : the mood was normal [Normal Insight/Judgement] : insight and judgment were intact [Comprehensive Foot Exam Normal] : Right and left foot were examined and both feet are normal. No ulcers in either foot. Toes are normal and with full ROM.  Normal tactile sensation with monofilament testing throughout both feet [de-identified] : Bilateral lower extremity edema  [de-identified] : edema no sores

## 2024-04-30 NOTE — HISTORY OF PRESENT ILLNESS
[Spouse] : spouse [FreeTextEntry1] : Follow-up, disease management, comprehensive blood work. [de-identified] : Patient is a 74-year-old gentleman who presents today for follow-up and disease management medical history is significant for: CA status post resection, bladder CA followed closely by urology also status post resection, COPD, dyspnea on exertion, diabetes mellitus type 2, and hypertension.  Presently the patient is awake alert and oriented x 3 in no acute distress calm and cooperative accompanied by his spouse.

## 2024-05-01 LAB
ALBUMIN SERPL ELPH-MCNC: 4.4 G/DL
ALP BLD-CCNC: 64 U/L
ALT SERPL-CCNC: 20 U/L
ANION GAP SERPL CALC-SCNC: 16 MMOL/L
AST SERPL-CCNC: 14 U/L
BASOPHILS # BLD AUTO: 0.04 K/UL
BASOPHILS NFR BLD AUTO: 0.5 %
BILIRUB SERPL-MCNC: 0.4 MG/DL
BUN SERPL-MCNC: 17 MG/DL
CALCIUM SERPL-MCNC: 9.7 MG/DL
CEA SERPL-MCNC: 2.3 NG/ML
CHLORIDE SERPL-SCNC: 99 MMOL/L
CHOLEST SERPL-MCNC: 153 MG/DL
CO2 SERPL-SCNC: 26 MMOL/L
CREAT SERPL-MCNC: 0.91 MG/DL
EGFR: 88 ML/MIN/1.73M2
EOSINOPHIL # BLD AUTO: 0.14 K/UL
EOSINOPHIL NFR BLD AUTO: 1.6 %
ESTIMATED AVERAGE GLUCOSE: 137 MG/DL
GLUCOSE SERPL-MCNC: 165 MG/DL
HBA1C MFR BLD HPLC: 6.4 %
HCT VFR BLD CALC: 45.7 %
HDLC SERPL-MCNC: 32 MG/DL
HGB BLD-MCNC: 14.9 G/DL
IMM GRANULOCYTES NFR BLD AUTO: 0.2 %
LDLC SERPL CALC-MCNC: 87 MG/DL
LYMPHOCYTES # BLD AUTO: 2.07 K/UL
LYMPHOCYTES NFR BLD AUTO: 23.7 %
MAN DIFF?: NORMAL
MCHC RBC-ENTMCNC: 28.9 PG
MCHC RBC-ENTMCNC: 32.6 GM/DL
MCV RBC AUTO: 88.7 FL
MONOCYTES # BLD AUTO: 0.62 K/UL
MONOCYTES NFR BLD AUTO: 7.1 %
NEUTROPHILS # BLD AUTO: 5.84 K/UL
NEUTROPHILS NFR BLD AUTO: 66.9 %
NONHDLC SERPL-MCNC: 121 MG/DL
PLATELET # BLD AUTO: 231 K/UL
POTASSIUM SERPL-SCNC: 3.5 MMOL/L
PROT SERPL-MCNC: 6.9 G/DL
RBC # BLD: 5.15 M/UL
RBC # FLD: 13.7 %
SODIUM SERPL-SCNC: 142 MMOL/L
TRIGL SERPL-MCNC: 200 MG/DL
WBC # FLD AUTO: 8.73 K/UL

## 2024-05-06 ENCOUNTER — TRANSCRIPTION ENCOUNTER (OUTPATIENT)
Age: 74
End: 2024-05-06

## 2024-05-07 ENCOUNTER — APPOINTMENT (OUTPATIENT)
Dept: SURGERY | Facility: HOSPITAL | Age: 74
End: 2024-05-07

## 2024-05-07 ENCOUNTER — OUTPATIENT (OUTPATIENT)
Dept: OUTPATIENT SERVICES | Facility: HOSPITAL | Age: 74
LOS: 1 days | End: 2024-05-07
Payer: MEDICARE

## 2024-05-07 ENCOUNTER — RESULT REVIEW (OUTPATIENT)
Age: 74
End: 2024-05-07

## 2024-05-07 DIAGNOSIS — Z12.11 ENCOUNTER FOR SCREENING FOR MALIGNANT NEOPLASM OF COLON: ICD-10-CM

## 2024-05-07 DIAGNOSIS — Z90.49 ACQUIRED ABSENCE OF OTHER SPECIFIED PARTS OF DIGESTIVE TRACT: Chronic | ICD-10-CM

## 2024-05-07 DIAGNOSIS — Z86.010 PERSONAL HISTORY OF COLONIC POLYPS: ICD-10-CM

## 2024-05-07 DIAGNOSIS — Z98.89 OTHER SPECIFIED POSTPROCEDURAL STATES: Chronic | ICD-10-CM

## 2024-05-07 DIAGNOSIS — Z85.030 PERSONAL HISTORY OF MALIGNANT CARCINOID TUMOR OF LARGE INTESTINE: ICD-10-CM

## 2024-05-07 LAB — GLUCOSE BLDC GLUCOMTR-MCNC: 162 MG/DL — HIGH (ref 70–99)

## 2024-05-07 PROCEDURE — 45385 COLONOSCOPY W/LESION REMOVAL: CPT | Mod: PT

## 2024-05-07 PROCEDURE — 82962 GLUCOSE BLOOD TEST: CPT

## 2024-05-07 PROCEDURE — 88300 SURGICAL PATH GROSS: CPT

## 2024-05-07 PROCEDURE — 88300 SURGICAL PATH GROSS: CPT | Mod: 26

## 2024-05-07 RX ORDER — SODIUM CHLORIDE 9 MG/ML
500 INJECTION INTRAMUSCULAR; INTRAVENOUS; SUBCUTANEOUS
Refills: 0 | Status: COMPLETED | OUTPATIENT
Start: 2024-05-07 | End: 2024-05-07

## 2024-05-07 RX ADMIN — SODIUM CHLORIDE 75 MILLILITER(S): 9 INJECTION INTRAMUSCULAR; INTRAVENOUS; SUBCUTANEOUS at 10:23

## 2024-05-07 RX ADMIN — SODIUM CHLORIDE 75 MILLILITER(S): 9 INJECTION INTRAMUSCULAR; INTRAVENOUS; SUBCUTANEOUS at 10:11

## 2024-05-31 LAB — SURGICAL PATHOLOGY STUDY: SIGNIFICANT CHANGE UP

## 2024-07-31 ENCOUNTER — APPOINTMENT (OUTPATIENT)
Dept: FAMILY MEDICINE | Facility: CLINIC | Age: 74
End: 2024-07-31
Payer: MEDICARE

## 2024-07-31 VITALS
RESPIRATION RATE: 16 BRPM | HEART RATE: 74 BPM | WEIGHT: 223 LBS | HEIGHT: 70 IN | DIASTOLIC BLOOD PRESSURE: 75 MMHG | OXYGEN SATURATION: 96 % | BODY MASS INDEX: 31.92 KG/M2 | TEMPERATURE: 98.1 F | SYSTOLIC BLOOD PRESSURE: 143 MMHG

## 2024-07-31 DIAGNOSIS — N40.0 BENIGN PROSTATIC HYPERPLASIA WITHOUT LOWER URINARY TRACT SYMPMS: ICD-10-CM

## 2024-07-31 DIAGNOSIS — C67.9 MALIGNANT NEOPLASM OF BLADDER, UNSPECIFIED: ICD-10-CM

## 2024-07-31 DIAGNOSIS — E11.9 TYPE 2 DIABETES MELLITUS W/OUT COMPLICATIONS: ICD-10-CM

## 2024-07-31 DIAGNOSIS — C18.9 MALIGNANT NEOPLASM OF COLON, UNSPECIFIED: ICD-10-CM

## 2024-07-31 DIAGNOSIS — E78.5 HYPERLIPIDEMIA, UNSPECIFIED: ICD-10-CM

## 2024-07-31 DIAGNOSIS — I10 ESSENTIAL (PRIMARY) HYPERTENSION: ICD-10-CM

## 2024-07-31 PROCEDURE — G2211 COMPLEX E/M VISIT ADD ON: CPT

## 2024-07-31 PROCEDURE — 36415 COLL VENOUS BLD VENIPUNCTURE: CPT

## 2024-07-31 PROCEDURE — 99214 OFFICE O/P EST MOD 30 MIN: CPT

## 2024-07-31 NOTE — ASSESSMENT
[FreeTextEntry1] : Assessment and plan:  1.  Bladder cancer status post resection reviewed most recent urological evaluation which was done this past February patient will be following up with urology in August patient is also scheduled for cystoscopy.  2.  Diabetes mellitus type 2 I will be checking hemoglobin A1c patient will increase Ozempic to 1 mg and metformin 1000 mg twice a day the metformin is weekly patient also follows a no concentrated sweets consistent carbohydrate diet.  Reviewed with patient his previous blood work hemoglobin A1c at that time was 6.4  3.  Hyperlipidemia continue low-fat low-cholesterol diet, increase physical activity, weight loss program and simvastatin 20 mg at bedtime.  4.  Hypertension finally excellent blood pressure control with clonidine 0.1 mg twice a day hydralazine 50 mg 3 times a day metoprolol succinate ER 50 mg daily and valsartan hydrochlorothiazide 3 20-25 1 daily.  5.  History of colon cancer status post resection patient did have repeat colonoscopy done recently unfortunately specimen that was biopsied was lost patient has to have repeat colonoscopy.  6.  Chronic obstructive pulmonary disease patient definitely cannot walk more than 200 feet and he does complain of dyspnea on exertion I also recommend cardiology evaluation.That is  follow-up he was evaluated in the past by cardiology  7.  Comprehensive blood work drawn in office by examiner total time spent face-to-face and non-face-to-face time 35 minutes the majority of which was spent on counseling and coordination of care

## 2024-07-31 NOTE — HISTORY OF PRESENT ILLNESS
[Spouse] : spouse [FreeTextEntry1] : Follow-up and disease management at this visit I will be obtaining comprehensive blood work. [de-identified] : Patient is a 74-year-old gentleman who presents today for follow-up and disease management medical history is significant for: CA status post resection, bladder CA followed closely by urology also status post resection, COPD, dyspnea on exertion, diabetes mellitus type 2, and hypertension.  Presently the patient is awake alert and oriented x 3 in no acute distress calm and cooperative accompanied by his spouse.

## 2024-07-31 NOTE — HEALTH RISK ASSESSMENT
[Yes] : Yes [2 - 4 times a month (2 pts)] : 2-4 times a month (2 points) [1 or 2 (0 pts)] : 1 or 2 (0 points) [Never (0 pts)] : Never (0 points) [No] : In the past 12 months have you used drugs other than those required for medical reasons? No [No falls in past year] : Patient reported no falls in the past year [Little interest or pleasure doing things] : 1) Little interest or pleasure doing things [Feeling down, depressed, or hopeless] : 2) Feeling down, depressed, or hopeless [0] : 2) Feeling down, depressed, or hopeless: Not at all (0) [PHQ-2 Negative - No further assessment needed] : PHQ-2 Negative - No further assessment needed [Audit-CScore] : 2 [[id number]] : 0 [With Patient/Caregiver] : , with patient/caregiver [Reviewed no changes] : Reviewed, no changes [Designated Healthcare Proxy] : Designated healthcare proxy [Relationship: ___] : Relationship: [unfilled] [Aggressive treatment] : aggressive treatment [I will adhere to the patient's wishes.] : I will adhere to the patient's wishes. [Former] : Former [AdvancecareDate] : 07/24 [20 or more] : 20 or more [> 15 Years] : > 15 Years

## 2024-07-31 NOTE — PHYSICAL EXAM
[No Acute Distress] : no acute distress [Well Nourished] : well nourished [Well Developed] : well developed [Well-Appearing] : well-appearing [Normal Voice/Communication] : normal voice/communication [Normal Sclera/Conjunctiva] : normal sclera/conjunctiva [PERRL] : pupils equal round and reactive to light [EOMI] : extraocular movements intact [Normal Outer Ear/Nose] : the outer ears and nose were normal in appearance [Normal Oropharynx] : the oropharynx was normal [No JVD] : no jugular venous distention [No Lymphadenopathy] : no lymphadenopathy [Supple] : supple [Thyroid Normal, No Nodules] : the thyroid was normal and there were no nodules present [No Respiratory Distress] : no respiratory distress  [No Accessory Muscle Use] : no accessory muscle use [Clear to Auscultation] : lungs were clear to auscultation bilaterally [Normal Rate] : normal rate  [Regular Rhythm] : with a regular rhythm [Normal S1, S2] : normal S1 and S2 [No Murmur] : no murmur heard [No Carotid Bruits] : no carotid bruits [No Abdominal Bruit] : a ~M bruit was not heard ~T in the abdomen [No Varicosities] : no varicosities [Pedal Pulses Present] : the pedal pulses are present [No Palpable Aorta] : no palpable aorta [No Extremity Clubbing/Cyanosis] : no extremity clubbing/cyanosis [Soft] : abdomen soft [Non Tender] : non-tender [Non-distended] : non-distended [No Masses] : no abdominal mass palpated [No HSM] : no HSM [Normal Bowel Sounds] : normal bowel sounds [No CVA Tenderness] : no CVA  tenderness [No Spinal Tenderness] : no spinal tenderness [No Joint Swelling] : no joint swelling [Grossly Normal Strength/Tone] : grossly normal strength/tone [No Rash] : no rash [Coordination Grossly Intact] : coordination grossly intact [No Focal Deficits] : no focal deficits [Normal Gait] : normal gait [Deep Tendon Reflexes (DTR)] : deep tendon reflexes were 2+ and symmetric [Speech Grossly Normal] : speech grossly normal [Memory Grossly Normal] : memory grossly normal [Normal Affect] : the affect was normal [Alert and Oriented x3] : oriented to person, place, and time [Normal Mood] : the mood was normal [Normal Insight/Judgement] : insight and judgment were intact [Comprehensive Foot Exam Normal] : Right and left foot were examined and both feet are normal. No ulcers in either foot. Toes are normal and with full ROM.  Normal tactile sensation with monofilament testing throughout both feet [de-identified] : Bilateral lower extremity edema  [de-identified] : edema no sores

## 2024-08-01 LAB
ALBUMIN SERPL ELPH-MCNC: 4.6 G/DL
ALP BLD-CCNC: 65 U/L
ALT SERPL-CCNC: 21 U/L
ANION GAP SERPL CALC-SCNC: 17 MMOL/L
APPEARANCE: CLEAR
AST SERPL-CCNC: 13 U/L
BACTERIA: NEGATIVE /HPF
BASOPHILS # BLD AUTO: 0.04 K/UL
BASOPHILS NFR BLD AUTO: 0.5 %
BILIRUB SERPL-MCNC: 0.4 MG/DL
BILIRUBIN URINE: NEGATIVE
BLOOD URINE: NEGATIVE
BUN SERPL-MCNC: 15 MG/DL
CALCIUM SERPL-MCNC: 9.9 MG/DL
CAST: 0 /LPF
CEA SERPL-MCNC: 2.8 NG/ML
CHLORIDE SERPL-SCNC: 100 MMOL/L
CHOLEST SERPL-MCNC: 146 MG/DL
CO2 SERPL-SCNC: 27 MMOL/L
COLOR: YELLOW
CREAT SERPL-MCNC: 1 MG/DL
CREAT SPEC-SCNC: 159 MG/DL
EGFR: 78 ML/MIN/1.73M2
EOSINOPHIL # BLD AUTO: 0.18 K/UL
EOSINOPHIL NFR BLD AUTO: 2 %
EPITHELIAL CELLS: 0 /HPF
ESTIMATED AVERAGE GLUCOSE: 143 MG/DL
GLUCOSE QUALITATIVE U: NEGATIVE MG/DL
GLUCOSE SERPL-MCNC: 162 MG/DL
HBA1C MFR BLD HPLC: 6.6 %
HCT VFR BLD CALC: 45.5 %
HDLC SERPL-MCNC: 32 MG/DL
HGB BLD-MCNC: 14.9 G/DL
IMM GRANULOCYTES NFR BLD AUTO: 0.3 %
KETONES URINE: NEGATIVE MG/DL
LDLC SERPL CALC-MCNC: 83 MG/DL
LEUKOCYTE ESTERASE URINE: NEGATIVE
LYMPHOCYTES # BLD AUTO: 1.94 K/UL
LYMPHOCYTES NFR BLD AUTO: 21.9 %
MAN DIFF?: NORMAL
MCHC RBC-ENTMCNC: 29 PG
MCHC RBC-ENTMCNC: 32.7 GM/DL
MCV RBC AUTO: 88.7 FL
MICROALBUMIN 24H UR DL<=1MG/L-MCNC: 2.1 MG/DL
MICROALBUMIN/CREAT 24H UR-RTO: 13 MG/G
MICROSCOPIC-UA: NORMAL
MONOCYTES # BLD AUTO: 0.68 K/UL
MONOCYTES NFR BLD AUTO: 7.7 %
NEUTROPHILS # BLD AUTO: 5.99 K/UL
NEUTROPHILS NFR BLD AUTO: 67.6 %
NITRITE URINE: NEGATIVE
NONHDLC SERPL-MCNC: 114 MG/DL
PH URINE: 7
PLATELET # BLD AUTO: 239 K/UL
POTASSIUM SERPL-SCNC: 3.7 MMOL/L
PROT SERPL-MCNC: 7.2 G/DL
PROTEIN URINE: NEGATIVE MG/DL
PSA SERPL-MCNC: 1.29 NG/ML
RBC # BLD: 5.13 M/UL
RBC # FLD: 13.2 %
RED BLOOD CELLS URINE: 2 /HPF
SODIUM SERPL-SCNC: 143 MMOL/L
SPECIFIC GRAVITY URINE: 1.02
TRIGL SERPL-MCNC: 178 MG/DL
UROBILINOGEN URINE: 1 MG/DL
WBC # FLD AUTO: 8.86 K/UL
WHITE BLOOD CELLS URINE: 0 /HPF

## 2024-08-01 RX ORDER — BLOOD-GLUCOSE METER
W/DEVICE KIT MISCELLANEOUS
Qty: 1 | Refills: 0 | Status: ACTIVE | COMMUNITY
Start: 2024-08-01 | End: 1900-01-01

## 2024-08-01 RX ORDER — BLOOD-GLUCOSE METER
KIT MISCELLANEOUS TWICE DAILY
Qty: 180 | Refills: 2 | Status: ACTIVE | COMMUNITY
Start: 2024-08-01 | End: 1900-01-01

## 2024-08-01 RX ORDER — LANCETS 33 GAUGE
EACH MISCELLANEOUS
Qty: 180 | Refills: 1 | Status: ACTIVE | COMMUNITY
Start: 2024-08-01 | End: 1900-01-01

## 2024-08-19 RX ORDER — BLOOD SUGAR DIAGNOSTIC
STRIP MISCELLANEOUS TWICE DAILY
Qty: 2 | Refills: 3 | Status: ACTIVE | COMMUNITY
Start: 2024-08-19 | End: 1900-01-01

## 2024-08-19 RX ORDER — BLOOD-GLUCOSE METER
W/DEVICE EACH MISCELLANEOUS
Qty: 1 | Refills: 0 | Status: ACTIVE | COMMUNITY
Start: 2024-08-19 | End: 1900-01-01

## 2024-08-19 RX ORDER — LANCETS
EACH MISCELLANEOUS
Qty: 2 | Refills: 3 | Status: ACTIVE | COMMUNITY
Start: 2024-08-19 | End: 1900-01-01

## 2024-10-30 NOTE — HEALTH RISK ASSESSMENT
[Yes] : Yes [2 - 4 times a month (2 pts)] : 2-4 times a month (2 points) [1 or 2 (0 pts)] : 1 or 2 (0 points) [Never (0 pts)] : Never (0 points) [No] : In the past 12 months have you used drugs other than those required for medical reasons? No [No falls in past year] : Patient reported no falls in the past year [Little interest or pleasure doing things] : 1) Little interest or pleasure doing things [Feeling down, depressed, or hopeless] : 2) Feeling down, depressed, or hopeless [0] : 2) Feeling down, depressed, or hopeless: Not at all (0) [PHQ-2 Negative - No further assessment needed] : PHQ-2 Negative - No further assessment needed [With Patient/Caregiver] : , with patient/caregiver [Reviewed no changes] : Reviewed, no changes [Designated Healthcare Proxy] : Designated healthcare proxy [Relationship: ___] : Relationship: [unfilled] [Aggressive treatment] : aggressive treatment [Former] : Former [20 or more] : 20 or more [> 15 Years] : > 15 Years [Audit-CScore] : 2 [[id number]] : 0 [AdvancecareDate] : 10/24

## 2024-10-30 NOTE — HISTORY OF PRESENT ILLNESS
[Spouse] : spouse [FreeTextEntry1] : Follow-up and disease management. [de-identified] : Patient is a 74-year-old gentleman who presents today for follow-up and disease management medical history is significant for: CA status post resection, bladder CA followed closely by urology also status post resection, COPD, dyspnea on exertion, diabetes mellitus type 2, and hypertension.  Presently the patient is awake alert and oriented x 3 in no acute distress calm and cooperative accompanied by his spouse.

## 2024-10-30 NOTE — PHYSICAL EXAM
[No Acute Distress] : no acute distress [Well Nourished] : well nourished [Well Developed] : well developed [Well-Appearing] : well-appearing [Normal Voice/Communication] : normal voice/communication [Normal Sclera/Conjunctiva] : normal sclera/conjunctiva [PERRL] : pupils equal round and reactive to light [EOMI] : extraocular movements intact [Normal Outer Ear/Nose] : the outer ears and nose were normal in appearance [Normal Oropharynx] : the oropharynx was normal [No JVD] : no jugular venous distention [No Lymphadenopathy] : no lymphadenopathy [Supple] : supple [Thyroid Normal, No Nodules] : the thyroid was normal and there were no nodules present [No Respiratory Distress] : no respiratory distress  [No Accessory Muscle Use] : no accessory muscle use [Clear to Auscultation] : lungs were clear to auscultation bilaterally [Normal Rate] : normal rate  [Regular Rhythm] : with a regular rhythm [Normal S1, S2] : normal S1 and S2 [No Murmur] : no murmur heard [No Carotid Bruits] : no carotid bruits [No Abdominal Bruit] : a ~M bruit was not heard ~T in the abdomen [No Varicosities] : no varicosities [Pedal Pulses Present] : the pedal pulses are present [No Palpable Aorta] : no palpable aorta [No Extremity Clubbing/Cyanosis] : no extremity clubbing/cyanosis [Soft] : abdomen soft [Non Tender] : non-tender [Non-distended] : non-distended [No Masses] : no abdominal mass palpated [No HSM] : no HSM [Normal Bowel Sounds] : normal bowel sounds [No CVA Tenderness] : no CVA  tenderness [No Spinal Tenderness] : no spinal tenderness [No Joint Swelling] : no joint swelling [Grossly Normal Strength/Tone] : grossly normal strength/tone [No Rash] : no rash [Coordination Grossly Intact] : coordination grossly intact [No Focal Deficits] : no focal deficits [Normal Gait] : normal gait [Deep Tendon Reflexes (DTR)] : deep tendon reflexes were 2+ and symmetric [Speech Grossly Normal] : speech grossly normal [Memory Grossly Normal] : memory grossly normal [Normal Affect] : the affect was normal [Alert and Oriented x3] : oriented to person, place, and time [Normal Mood] : the mood was normal [Normal Insight/Judgement] : insight and judgment were intact [Comprehensive Foot Exam Normal] : Right and left foot were examined and both feet are normal. No ulcers in either foot. Toes are normal and with full ROM.  Normal tactile sensation with monofilament testing throughout both feet [de-identified] : Bilateral lower extremity edema  [de-identified] : edema no sores

## 2025-02-14 NOTE — COUNSELING
[Fall prevention counseling provided] : Fall prevention counseling provided [Adequate lighting] : Adequate lighting [No throw rugs] : No throw rugs [Use proper foot wear] : Use proper foot wear [Behavioral health counseling provided] : Behavioral health counseling provided [Sleep ___ hours/day] : Sleep [unfilled] hours/day [Engage in a relaxing activity] : Engage in a relaxing activity [Plan in advance] : Plan in advance [AUDIT-C Screening administered and reviewed] : AUDIT-C Screening administered and reviewed [Potential consequences of obesity discussed] : Potential consequences of obesity discussed [Benefits of weight loss discussed] : Benefits of weight loss discussed [Structured Weight Management Program suggested:] : Structured weight management program suggested [Encouraged to maintain food diary] : Encouraged to maintain food diary [Encouraged to increase physical activity] : Encouraged to increase physical activity [Encouraged to use exercise tracking device] : Encouraged to use exercise tracking device [Target Wt Loss Goal ___] : Weight Loss Goals: Target weight loss goal [unfilled] lbs [Weigh Self Weekly] : weigh self weekly [Decrease Portions] : decrease portions [____ min/wk Activity] : [unfilled] min/wk activity [Keep Food Diary] : keep food diary [None] : None [Good understanding] : Patient has a good understanding of lifestyle changes and steps needed to achieve self management goal [FreeTextEntry1] : david

## 2025-02-14 NOTE — HEALTH RISK ASSESSMENT
[Yes] : Yes [2 - 4 times a month (2 pts)] : 2-4 times a month (2 points) [1 or 2 (0 pts)] : 1 or 2 (0 points) [Never (0 pts)] : Never (0 points) [No] : In the past 12 months have you used drugs other than those required for medical reasons? No [No falls in past year] : Patient reported no falls in the past year [Little interest or pleasure doing things] : 1) Little interest or pleasure doing things [Feeling down, depressed, or hopeless] : 2) Feeling down, depressed, or hopeless [0] : 2) Feeling down, depressed, or hopeless: Not at all (0) [PHQ-2 Negative - No further assessment needed] : PHQ-2 Negative - No further assessment needed [With Patient/Caregiver] : , with patient/caregiver [Reviewed no changes] : Reviewed, no changes [Designated Healthcare Proxy] : Designated healthcare proxy [Relationship: ___] : Relationship: [unfilled] [Aggressive treatment] : aggressive treatment [I will adhere to the patient's wishes.] : I will adhere to the patient's wishes. [Former] : Former [20 or more] : 20 or more [> 15 Years] : > 15 Years [Audit-CScore] : 2 [[id number]] : 0 [AdvancecareDate] : 02/25

## 2025-02-14 NOTE — HISTORY OF PRESENT ILLNESS
[FreeTextEntry1] : Patient here for follow-up and disease management. [de-identified] :  Patient is a 75-year-old gentleman who presents today for follow-up and disease management.  Patient states he has been in his usual state of health presently awake alert and oriented x 3 in no acute distress.  Patient's medical history significant for bladder cancer, BPH still complains of urinary frequency, colon cancer status post resection and he has followed up with surgery his last appointment was March 11, 2024 there is no sign of recurrence, chronic obstructive pulmonary disease mild, diabetes mellitus type 2, hyperlipidemia, hypertension and elevated BMI.

## 2025-02-14 NOTE — ASSESSMENT
[FreeTextEntry1] : Assessment and plan:  1.  Bladder cancer status post resection reviewed most recent urological evaluation which was done this past February patient will be following up with urology in August patient is also scheduled for cystoscopy.  2.  Diabetes mellitus type 2 I will be checking hemoglobin A1c patient will increase Ozempic to 1 mg and metformin 1000 mg twice a day the metformin is weekly patient also follows a no concentrated sweets consistent carbohydrate diet.  Reviewed with patient his previous blood work hemoglobin A1c at that time was 6.4  3.  Hyperlipidemia continue low-fat low-cholesterol diet, increase physical activity, weight loss program and simvastatin 20 mg at bedtime.  4.  Hypertension finally excellent blood pressure control with clonidine 0.1 mg twice a day hydralazine 50 mg 3 times a day metoprolol succinate ER 50 mg daily and valsartan hydrochlorothiazide 3 20-25 1 daily.  5.  History of colon cancer status post resection patient did have repeat colonoscopy done recently unfortunately specimen that was biopsied was lost patient has to have repeat colonoscopy.  6.  Chronic obstructive pulmonary disease patient definitely cannot walk more than 200 feet and he does complain of dyspnea on exertion I also recommend cardiology evaluation.That is  follow-up he was evaluated in the past by cardiology  7.  Comprehensive blood work drawn in office by examiner total time spent face-to-face and non-face-to-face time 35 minutes the majority of which was spent on counseling and coordination of care medication reconciliation done at this visit all prescriptions needed to be refilled and sent to [facility].

## 2025-02-14 NOTE — PHYSICAL EXAM
[No Acute Distress] : no acute distress [Well Nourished] : well nourished [Well Developed] : well developed [Well-Appearing] : well-appearing [Normal Voice/Communication] : normal voice/communication [Normal Sclera/Conjunctiva] : normal sclera/conjunctiva [PERRL] : pupils equal round and reactive to light [EOMI] : extraocular movements intact [Normal Outer Ear/Nose] : the outer ears and nose were normal in appearance [Normal Oropharynx] : the oropharynx was normal [No JVD] : no jugular venous distention [No Lymphadenopathy] : no lymphadenopathy [Supple] : supple [Thyroid Normal, No Nodules] : the thyroid was normal and there were no nodules present [No Respiratory Distress] : no respiratory distress  [No Accessory Muscle Use] : no accessory muscle use [Clear to Auscultation] : lungs were clear to auscultation bilaterally [Normal Rate] : normal rate  [Regular Rhythm] : with a regular rhythm [Normal S1, S2] : normal S1 and S2 [No Murmur] : no murmur heard [No Carotid Bruits] : no carotid bruits [No Abdominal Bruit] : a ~M bruit was not heard ~T in the abdomen [No Varicosities] : no varicosities [Pedal Pulses Present] : the pedal pulses are present [No Palpable Aorta] : no palpable aorta [No Extremity Clubbing/Cyanosis] : no extremity clubbing/cyanosis [Soft] : abdomen soft [Non Tender] : non-tender [Non-distended] : non-distended [No Masses] : no abdominal mass palpated [No HSM] : no HSM [Normal Bowel Sounds] : normal bowel sounds [No CVA Tenderness] : no CVA  tenderness [No Spinal Tenderness] : no spinal tenderness [No Joint Swelling] : no joint swelling [Grossly Normal Strength/Tone] : grossly normal strength/tone [No Rash] : no rash [Coordination Grossly Intact] : coordination grossly intact [No Focal Deficits] : no focal deficits [Normal Gait] : normal gait [Deep Tendon Reflexes (DTR)] : deep tendon reflexes were 2+ and symmetric [Speech Grossly Normal] : speech grossly normal [Memory Grossly Normal] : memory grossly normal [Normal Affect] : the affect was normal [Alert and Oriented x3] : oriented to person, place, and time [Normal Mood] : the mood was normal [Normal Insight/Judgement] : insight and judgment were intact [Comprehensive Foot Exam Normal] : Right and left foot were examined and both feet are normal. No ulcers in either foot. Toes are normal and with full ROM.  Normal tactile sensation with monofilament testing throughout both feet [de-identified] : Bilateral lower extremity edema  [de-identified] : edema no sores

## 2025-05-05 NOTE — PHYSICAL EXAM
[No Acute Distress] : no acute distress [Well Nourished] : well nourished [Well Developed] : well developed [Well-Appearing] : well-appearing [Normal Voice/Communication] : normal voice/communication [Normal Sclera/Conjunctiva] : normal sclera/conjunctiva [PERRL] : pupils equal round and reactive to light [EOMI] : extraocular movements intact [Normal Outer Ear/Nose] : the outer ears and nose were normal in appearance [Normal Oropharynx] : the oropharynx was normal [No JVD] : no jugular venous distention [No Lymphadenopathy] : no lymphadenopathy [Supple] : supple [Thyroid Normal, No Nodules] : the thyroid was normal and there were no nodules present [No Respiratory Distress] : no respiratory distress  [No Accessory Muscle Use] : no accessory muscle use [Clear to Auscultation] : lungs were clear to auscultation bilaterally [Normal Rate] : normal rate  [Regular Rhythm] : with a regular rhythm [Normal S1, S2] : normal S1 and S2 [No Murmur] : no murmur heard [No Carotid Bruits] : no carotid bruits [No Abdominal Bruit] : a ~M bruit was not heard ~T in the abdomen [No Varicosities] : no varicosities [Pedal Pulses Present] : the pedal pulses are present [No Palpable Aorta] : no palpable aorta [No Extremity Clubbing/Cyanosis] : no extremity clubbing/cyanosis [Soft] : abdomen soft [Non Tender] : non-tender [Non-distended] : non-distended [No Masses] : no abdominal mass palpated [No HSM] : no HSM [Normal Bowel Sounds] : normal bowel sounds [No CVA Tenderness] : no CVA  tenderness [No Spinal Tenderness] : no spinal tenderness [No Joint Swelling] : no joint swelling [Grossly Normal Strength/Tone] : grossly normal strength/tone [No Rash] : no rash [Coordination Grossly Intact] : coordination grossly intact [No Focal Deficits] : no focal deficits [Normal Gait] : normal gait [Deep Tendon Reflexes (DTR)] : deep tendon reflexes were 2+ and symmetric [Speech Grossly Normal] : speech grossly normal [Memory Grossly Normal] : memory grossly normal [Normal Affect] : the affect was normal [Alert and Oriented x3] : oriented to person, place, and time [Normal Mood] : the mood was normal [Normal Insight/Judgement] : insight and judgment were intact [Comprehensive Foot Exam Normal] : Right and left foot were examined and both feet are normal. No ulcers in either foot. Toes are normal and with full ROM.  Normal tactile sensation with monofilament testing throughout both feet [de-identified] : Bilateral lower extremity edema  [de-identified] : edema no sores

## 2025-05-05 NOTE — ASSESSMENT
[FreeTextEntry1] : Assessment and plan:  1.  Bladder cancer status post resection reviewed most recent urological evaluation which was done this past February patient will be following up with urology in August patient is also scheduled for cystoscopy.  2.  Diabetes mellitus type 2 . Continue Ozempic  1 mg and metformin 1000 mg twice a day the metformin is weekly patient also follows a no concentrated sweets consistent carbohydrate diet.  Reviewed with patient his previous blood work hemoglobin A1c at that time was 6.4  3.  Hyperlipidemia continue low-fat low-cholesterol diet, increase physical activity, weight loss program and simvastatin 20 mg at bedtime.  4.  Hypertension finally excellent blood pressure control with clonidine 0.1 mg twice a day hydralazine 50 mg 3 times a day metoprolol succinate ER 50 mg daily and valsartan hydrochlorothiazide 3 20-25 1 daily.  5.  History of colon cancer status post resection patient did have repeat colonoscopy done recently unfortunately specimen that was biopsied was lost patient has to have repeat colonoscopy.  6.  Chronic obstructive pulmonary disease patient definitely cannot walk more than 200 feet and he does complain of dyspnea on exertion I also recommend cardiology follow-up.  7.  Comprehensive blood work discussed with patient it was drawn at last visit.  Total time spent face-to-face and non-face-to-face time 35 minutes the majority of which was spent in counseling and coordination of care.

## 2025-05-05 NOTE — HEALTH RISK ASSESSMENT
[Yes] : Yes [2 - 4 times a month (2 pts)] : 2-4 times a month (2 points) [1 or 2 (0 pts)] : 1 or 2 (0 points) [Never (0 pts)] : Never (0 points) [No] : In the past 12 months have you used drugs other than those required for medical reasons? No [No falls in past year] : Patient reported no falls in the past year [Little interest or pleasure doing things] : 1) Little interest or pleasure doing things [Feeling down, depressed, or hopeless] : 2) Feeling down, depressed, or hopeless [0] : 2) Feeling down, depressed, or hopeless: Not at all (0) [PHQ-2 Negative - No further assessment needed] : PHQ-2 Negative - No further assessment needed [With Patient/Caregiver] : , with patient/caregiver [Reviewed no changes] : Reviewed, no changes [Designated Healthcare Proxy] : Designated healthcare proxy [Relationship: ___] : Relationship: [unfilled] [Aggressive treatment] : aggressive treatment [I will adhere to the patient's wishes.] : I will adhere to the patient's wishes. [Former] : Former [20 or more] : 20 or more [> 15 Years] : > 15 Years [Audit-CScore] : 2 [[id number]] : 0 [AdvancecareDate] : 05/25

## 2025-05-05 NOTE — HISTORY OF PRESENT ILLNESS
[Spouse] : spouse [FreeTextEntry1] : Follow-up and disease management. [de-identified] :  Patient is a 75-year-old gentleman who presents today for follow-up and disease management.  Patient states he has been in his usual state of health presently awake alert and oriented x 3 in no acute distress.  Patient's medical history significant for bladder cancer, BPH still complains of urinary frequency, colon cancer status post resection and he has followed up with surgery his last appointment was March 11, 2024 there is no sign of recurrence, chronic obstructive pulmonary disease mild, diabetes mellitus type 2, hyperlipidemia, hypertension and elevated BMI.

## 2025-07-30 NOTE — HISTORY OF PRESENT ILLNESS
[Spouse] : spouse [FreeTextEntry1] : Follow-up and disease management. [de-identified] :  Patient is a 75-year-old gentleman who presents today for follow-up and disease management.  Patient states he has been in his usual state of health presently awake alert and oriented x 3 in no acute distress.  Patient's medical history significant for bladder cancer, BPH still complains of urinary frequency, colon cancer status post resection and he has followed up with surgery his last appointment was March 11, 2024 there is no sign of recurrence, chronic obstructive pulmonary disease mild, diabetes mellitus type 2, hyperlipidemia, hypertension and elevated BMI.

## 2025-07-30 NOTE — HEALTH RISK ASSESSMENT
[Yes] : Yes [2 - 4 times a month (2 pts)] : 2-4 times a month (2 points) [1 or 2 (0 pts)] : 1 or 2 (0 points) [Never (0 pts)] : Never (0 points) [No] : In the past 12 months have you used drugs other than those required for medical reasons? No [No falls in past year] : Patient reported no falls in the past year [Little interest or pleasure doing things] : 1) Little interest or pleasure doing things [Feeling down, depressed, or hopeless] : 2) Feeling down, depressed, or hopeless [0] : 2) Feeling down, depressed, or hopeless: Not at all (0) [PHQ-2 Negative - No further assessment needed] : PHQ-2 Negative - No further assessment needed [With Patient/Caregiver] : , with patient/caregiver [Reviewed no changes] : Reviewed, no changes [Designated Healthcare Proxy] : Designated healthcare proxy [Relationship: ___] : Relationship: [unfilled] [Aggressive treatment] : aggressive treatment [I will adhere to the patient's wishes.] : I will adhere to the patient's wishes. [Former] : Former [20 or more] : 20 or more [> 15 Years] : > 15 Years [Audit-CScore] : 2 [[id number]] : 0 [AdvancecareDate] : 07/25

## 2025-07-30 NOTE — ASSESSMENT
[FreeTextEntry1] : Assessment and plan:  1.  Bladder cancer status post resection reviewed most recent urological evaluation which was done this past February patient will be following up with urology in August patient is also scheduled for cystoscopy.  2.  Diabetes mellitus type 2 . Continue Ozempic  1 mg and metformin 1000 mg twice a day the metformin is weekly patient also follows a no concentrated sweets consistent carbohydrate diet.  Reviewed with patient his previous blood work hemoglobin A1c at that time was 6.8  3.  Hyperlipidemia continue low-fat low-cholesterol diet, increase physical activity, weight loss program and simvastatin 20 mg at bedtime.  4.  Hypertension finally excellent blood pressure control with clonidine 0.1 mg twice a day hydralazine 50 mg 3 times a day metoprolol succinate ER 50 mg daily and valsartan hydrochlorothiazide 3 20-25 1 daily.  5.  History of colon cancer status post resection patient did have repeat colonoscopy done recently unfortunately specimen that was biopsied was lost patient has to have repeat colonoscopy which will be scheduled.  6.  Chronic obstructive pulmonary disease patient definitely cannot walk more than 200 feet and he does complain of dyspnea on exertion I also recommend cardiology follow-up.  7.  Comprehensive blood work drawn in office by examiner.  Total time spent face-to-face and non-face-to-face time 35 minutes the majority of which was spent in counseling and coordination of care.

## 2025-07-30 NOTE — PHYSICAL EXAM
[No Acute Distress] : no acute distress [Well Nourished] : well nourished [Well Developed] : well developed [Well-Appearing] : well-appearing [Normal Voice/Communication] : normal voice/communication [Normal Sclera/Conjunctiva] : normal sclera/conjunctiva [PERRL] : pupils equal round and reactive to light [EOMI] : extraocular movements intact [Normal Outer Ear/Nose] : the outer ears and nose were normal in appearance [Normal Oropharynx] : the oropharynx was normal [No JVD] : no jugular venous distention [No Lymphadenopathy] : no lymphadenopathy [Supple] : supple [Thyroid Normal, No Nodules] : the thyroid was normal and there were no nodules present [No Respiratory Distress] : no respiratory distress  [No Accessory Muscle Use] : no accessory muscle use [Clear to Auscultation] : lungs were clear to auscultation bilaterally [Normal Rate] : normal rate  [Regular Rhythm] : with a regular rhythm [Normal S1, S2] : normal S1 and S2 [No Murmur] : no murmur heard [No Carotid Bruits] : no carotid bruits [No Abdominal Bruit] : a ~M bruit was not heard ~T in the abdomen [No Varicosities] : no varicosities [Pedal Pulses Present] : the pedal pulses are present [No Palpable Aorta] : no palpable aorta [No Extremity Clubbing/Cyanosis] : no extremity clubbing/cyanosis [Soft] : abdomen soft [Non Tender] : non-tender [Non-distended] : non-distended [No Masses] : no abdominal mass palpated [No HSM] : no HSM [Normal Bowel Sounds] : normal bowel sounds [No CVA Tenderness] : no CVA  tenderness [No Spinal Tenderness] : no spinal tenderness [No Joint Swelling] : no joint swelling [Grossly Normal Strength/Tone] : grossly normal strength/tone [No Rash] : no rash [Coordination Grossly Intact] : coordination grossly intact [No Focal Deficits] : no focal deficits [Normal Gait] : normal gait [Deep Tendon Reflexes (DTR)] : deep tendon reflexes were 2+ and symmetric [Speech Grossly Normal] : speech grossly normal [Memory Grossly Normal] : memory grossly normal [Normal Affect] : the affect was normal [Alert and Oriented x3] : oriented to person, place, and time [Normal Mood] : the mood was normal [Normal Insight/Judgement] : insight and judgment were intact [Comprehensive Foot Exam Normal] : Right and left foot were examined and both feet are normal. No ulcers in either foot. Toes are normal and with full ROM.  Normal tactile sensation with monofilament testing throughout both feet [de-identified] : Bilateral lower extremity edema  [de-identified] : edema no sores

## (undated) DEVICE — POLY TRAP ETRAP

## (undated) DEVICE — PLATE NESSY 170

## (undated) DEVICE — ENDOCUFF VISION SZ 2 LG GRN

## (undated) DEVICE — SOL IRR POUR H2O 1000ML

## (undated) DEVICE — SNARE POLYP SENS SM 13MM 240CM

## (undated) DEVICE — CONTAINER FORMALIN BUFF 10% 60ML

## (undated) DEVICE — TUBING CAP SET ERBEFLO CLEVERCAP HYBRID CO2 FOR OLYMPUS SCOPES AND UCR

## (undated) DEVICE — SNARE EXACTO COLD 9MMX230CM

## (undated) DEVICE — FORCEP RADIAL JAW 4 240CM DISP

## (undated) DEVICE — KIT ENDO PROCEDURE CUST W/VLV